# Patient Record
Sex: FEMALE | Race: WHITE | Employment: FULL TIME | URBAN - METROPOLITAN AREA
[De-identification: names, ages, dates, MRNs, and addresses within clinical notes are randomized per-mention and may not be internally consistent; named-entity substitution may affect disease eponyms.]

---

## 2017-12-19 ENCOUNTER — HOSPITAL ENCOUNTER (OUTPATIENT)
Dept: ULTRASOUND IMAGING | Age: 32
Discharge: HOME | End: 2017-12-19
Attending: FAMILY MEDICINE
Payer: OTHER GOVERNMENT

## 2017-12-19 DIAGNOSIS — N94.10 DYSPAREUNIA IN FEMALE: ICD-10-CM

## 2017-12-19 PROCEDURE — 76830 TRANSVAGINAL US NON-OB: CPT

## 2018-02-15 PROBLEM — Z30.09 ENCOUNTER FOR EVALUATION REGARDING CONTRACEPTION OPTIONS: Status: ACTIVE | Noted: 2018-02-15

## 2018-03-15 ENCOUNTER — HOSPITAL ENCOUNTER (OUTPATIENT)
Dept: PHYSICAL THERAPY | Age: 33
Discharge: HOME | End: 2018-03-15
Attending: FAMILY MEDICINE
Payer: OTHER GOVERNMENT

## 2018-03-15 DIAGNOSIS — N94.10 DYSPAREUNIA, FEMALE: ICD-10-CM

## 2018-03-15 PROCEDURE — 97530 THERAPEUTIC ACTIVITIES: CPT

## 2018-03-15 PROCEDURE — 97161 PT EVAL LOW COMPLEX 20 MIN: CPT

## 2018-03-15 NOTE — THERAPY EVALUATION
Rosalba Deandre  : 1985  Primary: Quincy Zhou Prime  Secondary:  [address]  at [facility]  [address], [address], [place] [address]  [phone]   [phone]        OUTPATIENT PHYSICAL THERAPY:Initial Assessment 3/15/2018    ICD-10: Treatment Diagnosis: R27.8 Lack of coordination (muscle incoordination)  Precautions/Allergies:   Review of patient's allergies indicates no known allergies. Fall Risk Score: 0 (? 5 = High Risk)  MD Orders: eval and treat MEDICAL/REFERRING DIAGNOSIS:  Dyspareunia, female [N94.10]   DATE OF ONSET: ; after first child was born  REFERRING PHYSICIAN: Pruitt, Benjamine Hashimoto,   [address]: Not scheduled     INITIAL ASSESSMENT:  Ms. Lukasz Small presents elevated pelvic floor muscle (PFM) activity and difficulty with coordination of PFM, likely leading with decreased sensation throughout during intercourse. Additionally she demonstrates moderate muscle and fascial restrictions of the abdomen with increased tension throughout. I believe she will benefit from skilled PT with an emphasis on pelvic floor down training, sensory input techniques, manual therapy, core stabilization and slow integration of hip stabilization over 8 weeks to improve PFM coordination and sensation and restore normal function of the pelvic floor. PROBLEM LIST (Impacting functional limitations):  1. Decreased coordination INTERVENTIONS PLANNED:  1. Electrical Stimulation  2. Home Exercise Program (HEP)  3. Manual Therapy  4. Neuromuscular Re-education/Strengthening  5. Range of Motion (ROM)  6. Therapeutic Activites  7. Therapeutic Exercise/Strengthening   TREATMENT PLAN:  Effective Dates: 3/15/2018 TO 2018 (60 days). Frequency/Duration: 1 time a week for 60 Days  GOALS: (Goals have been discussed and agreed upon with patient.)  Short-Term Functional Goals: Time Frame: 8 weeks  1. Pt will be I with HEP.   2. Pt will demonstrate normal voluntary relaxation of the pelvic floor muscle group for 30 sections via sEMG (< 2.0mV) to improve pelvic floor ROM. 3. Pt will increase hip strength to 4/5 for improved lumbopelvic stability and restore normal PFM function. Rehabilitation Potential For Stated Goals: Good  Regarding Kim Pimenteltano therapy, I certify that the treatment plan above will be carried out by a therapist or under their direction. Thank you for this referral,  Brennan Delaney, PT               The information in this section was collected on 3/15/2018 (except where otherwise noted). HISTORY:   History of Present Injury/Illness (Reason for Referral):  Alexys Grover is a 35 yo F that presents to PT w/ c/o decreased sensation during intercourse. Pt states that after her first child was born in  (via ) he began experiencing decreased sensation during intercourse. She states that intercourse in not painful, however she does not feel much deep during intercourse. She notes that she occasionally will experience sharp pain in the abdomen, however notes this only happens once every few months and goes away quickly. Urinary: urinary freq: 10x/day, occasionally up 1x/night (infrequent); awareness with urinary urge, able to put off urge \"for a long time\", occasionally experience urine loss if holding for extended periods of time, denies ELISABET/UUI, dysuria, hesitancy; Fluid intake: water x48oz, coffee x16oz, occasional soda 1x/week  Bowel: bowel freq: 1x/day, Titus stool type 3, denies push/strain, no history or current management of constipation, denies pain and FI; awareness with bowel urge and able to put off urge as needed  Sexual: sexually active, no history of sexual abuse, pt denies pain with intercourse, however notes \"decreased sensation\" during intercourse since birth of first child in ; not using lubrication    Past Medical History/Comorbidities:   Ms. Baltazar Maki  has no past medical history on file.   Ms. Baltazar Maki  has a past surgical history that includes hx lap cholecystectomy; hx wisdom teeth extraction; hx  section; and hx other surgical.  (, , ), tummy tuck/liposuction (2017)  Social History/Living Environment:     Lives at home with her  and 3 children, ages 10,10 & 3)  Prior Level of Function/Work/Activity:   Pt works full-time and sits at desk a lot during the day. Current Medications:     No current outpatient prescriptions on file. OTC supplements: fish oil, flax seed oil, \"water retention pills\", collagen/hair/nail vitamin   Date Last Reviewed:  3/15/2018    0: LOW COMPLEXITY   EXAMINATION:   Palpation:          Mild tenderness with palpation of R superficial transverse perineal and R pubococcygeus; non tender elsewhere, both superficial and deep PF. Restrictions throughout abdominal wall with good scar mobility  ROM:          Excellent PFM ROM  Strength:          P: Power, E: Endurance, R: Repetitions, QF: Quick Flicks, TrA: Transverse Abdominus, DB: Diaphragmatic Breathing  P 3/5   E 10 seconds   R 4   QF No tested   TrA    DB Chest dominant     Coordination:          Pt demonstrates good voluntary contraction, relaxation and bulge, however after endurance repetitions, pt has increased difficulty achieving full resting position of PFM  Sensation:  Sensory testing WNL, bilaterally   Body Structures Involved:  1. Nerves  2. Muscles Body Functions Affected:  1. Sensory/Pain  2. Neuromusculoskeletal Activities and Participation Affected:  1. Learning and Applying Knowledge  2. Domestic Life  3.  Interpersonal Interactions and Relationships   Number of elements (examined above) that affect the Plan of Care: 1-2: LOW COMPLEXITY   CLINICAL PRESENTATION:   Presentation: Stable and uncomplicated: LOW COMPLEXITY   CLINICAL DECISION MAKING:   Outcome Measure:   Pelvic Floor Distress Inventory - Short form (PFDI-20)  Score (out of 300) Initial: 3/15/2018   18/300 Most Recent: Interpretation of Score: This survey asks questions concerning certain bowel, bladder, or pelvic symptoms and how much these symptoms interfere with daily activities. Each section is scored on a 0-4 scale, 4 representing the greatest disability. The scores of each section (out of 100) are added together for a total score out of 300. Score 0 1-59  120-179 180-239 240-299 300   Modifier CH CI CJ CK CL CM CN       Medical Necessity:   · Patient demonstrates good rehab potential due to higher previous functional level. Reason for Services/Other Comments:  · Patient requires skilled physical therapy in order to address muscle incoordiantion and decrease sensation in order to return pt to PLOF. Use of outcome tool(s) and clinical judgement create a POC that gives a: Clear prediction of patient's progress: LOW COMPLEXITY            TREATMENT:   (In addition to Assessment/Re-Assessment sessions the following treatments were rendered)  Pre-treatment Symptoms/Complaints:  See subjective history. Pain: Initial:   Pain Intensity 1: 0  Post Session:  0     THERAPEUTIC ACTIVITY: ( 15 minutes): Therapeutic activities per grid below to improve patient's understanding of the role of PFM in sexual function and contribting factors to deficits. Required moderate verbal and tactile cues to promote coordination of breathing. Date:  3/15/2018 Date:   Date:     Activity/Exercise Parameters Parameters Parameters   Role of PFM in relation to bowel, bladder and sexual function 6 minutes     Diaphragmatic breathing 5 minutes     HEP D. Breathing & PFM drops- 4 minutes                                 MANUAL THERAPY: (5 minutes): Soft tissue mobilization was utilized and necessary because of the patient's restricted motion of soft tissue. Internal vaginal interventions performed with verbal consent.     Date Type Location Time Comments   3./15/2018 Internal assessment/treatment Via vaginal canal 5 Gentle sustained pressure, S/CS superficial and deep PF                                               (Used abbreviations: MET - muscle energy technique; S/CS- Strain counter strain; CTM-Connective tissue mobilizations; C/R- Contract/relax; SP- Sustained pressure, TrP-Trigger point release, IASTM- Instrument assisted soft tissue mobilizations, TDN-Trigger point dry needling)    [facility] Portal  Treatment/Session Assessment:    · Response to Treatment:  Pt demonstrate improved coordination of diaphragmatic breathing post education. Demonstrates good understanding of HEP. · Compliance with Program/Exercises: Will assess as treatment progresses. · Recommendations/Intent for next treatment session: \"Next visit will focus on downtraining, labial CTM, nerve glides, discuss use of e-stim for sensory input\". Total Treatment Duration: 51 minutes  PT Patient Time In/Time Out  Time In: 0810  Time Out: 92 Alec Roa  Nix, PT, DPT

## 2018-03-15 NOTE — PROGRESS NOTES
Ambulatory/Rehab Services H2 Model Falls Risk Assessment    Risk Factor Pts. ·   Confusion/Disorientation/Impulsivity  []    4 ·   Symptomatic Depression  []   2 ·   Altered Elimination  []   1 ·   Dizziness/Vertigo  []   1 ·   Gender (Male)  []   1 ·   Any administered antiepileptics (anticonvulsants):  []   2 ·   Any administered benzodiazepines:  []   1 ·   Visual Impairment (specify):  []   1 ·   Portable Oxygen Use  []   1 ·   Orthostatic ? BP  []   1 ·   History of Recent Falls (within 3 mos.)  []   5     Ability to Rise from Chair (choose one) Pts. ·   Ability to rise in a single movement  []   0 ·   Pushes up, successful in one attempt  []   1 ·   Multiple attempts, but successful  []   3 ·   Unable to rise without assistance  []   4   Total: (5 or greater = High Risk) 0     Falls Prevention Plan:   []                Physical Limitations to Exercise (specify):   []                Mobility Assistance Device (type):   []                Exercise/Equipment Adaptation (specify):    ©2010 [facility] [address] Patent #[id number].  Federal Law prohibits the replication, distribution or use without written permission from [facility] [employer]

## 2018-03-20 ENCOUNTER — HOSPITAL ENCOUNTER (OUTPATIENT)
Dept: PHYSICAL THERAPY | Age: 33
Discharge: HOME | End: 2018-03-20
Attending: FAMILY MEDICINE
Payer: OTHER GOVERNMENT

## 2018-03-20 PROCEDURE — 97530 THERAPEUTIC ACTIVITIES: CPT

## 2018-03-20 PROCEDURE — 97014 ELECTRIC STIMULATION THERAPY: CPT

## 2018-03-20 PROCEDURE — 97140 MANUAL THERAPY 1/> REGIONS: CPT

## 2018-03-20 NOTE — PROGRESS NOTES
Robin Gallardo  : 1985  Primary: Sc  Prime  Secondary:  [address]  [facility]  [address], [address], [place] [address]  [phone]   [phone]        OUTPATIENT PHYSICAL THERAPY:Daily Note 3/20/2018    ICD-10: Treatment Diagnosis: R27.8 Lack of coordination (muscle incoordination)  Precautions/Allergies:   Review of patient's allergies indicates no known allergies. Fall Risk Score: 0 (? 5 = High Risk)  MD Orders: eval and treat MEDICAL/REFERRING DIAGNOSIS:  There are no admission diagnoses documented for this encounter. DATE OF ONSET: ; after first child was born  REFERRING PHYSICIAN: Mary Ellen Lin DO  RETURN PHYSICIAN APPOINTMENT: Not scheduled     INITIAL ASSESSMENT:  Ms. Andres Smith presents elevated pelvic floor muscle (PFM) activity and difficulty with coordination of PFM, likely leading with decreased sensation throughout during intercourse. Additionally she demonstrates moderate muscle and fascial restrictions of the abdomen with increased tension throughout. I believe she will benefit from skilled PT with an emphasis on pelvic floor down training, sensory input techniques, manual therapy, core stabilization and slow integration of hip stabilization over 8 weeks to improve PFM coordination and sensation and restore normal function of the pelvic floor. PROBLEM LIST (Impacting functional limitations):  1. Decreased coordination INTERVENTIONS PLANNED:  1. Electrical Stimulation  2. Home Exercise Program (HEP)  3. Manual Therapy  4. Neuromuscular Re-education/Strengthening  5. Range of Motion (ROM)  6. Therapeutic Activites  7. Therapeutic Exercise/Strengthening   TREATMENT PLAN:  Effective Dates: 3/15/2018 TO 2018 (60 days). Frequency/Duration: 1 time a week for 60 Days  GOALS: (Goals have been discussed and agreed upon with patient.)  Short-Term Functional Goals: Time Frame: 8 weeks  1. Pt will be I with HEP.   2. Pt will demonstrate normal voluntary relaxation of the pelvic floor muscle group for 30 sections via sEMG (< 2.0mV) to improve pelvic floor ROM. 3. Pt will increase hip strength to 4/5 for improved lumbopelvic stability and restore normal PFM function. Rehabilitation Potential For Stated Goals: Good  Regarding Roc[place] therapy, I certify that the treatment plan above will be carried out by a therapist or under their direction. Thank you for this referral,  Lois Kothari PT               The information in this section was collected on 3/15/2018 (except where otherwise noted). HISTORY:   History of Present Injury/Illness (Reason for Referral):  Sharee Anthony is a 35 yo F that presents to PT w/ c/o decreased sensation during intercourse. Pt states that after her first child was born in  (via ) she began experiencing decreased sensation during intercourse. She states that intercourse is not painful, however she does not feel much deep during intercourse. She notes that she occasionally will experience sharp pain in the abdomen, however notes this only happens once every few months and goes away quickly. Urinary: urinary freq: 10x/day, occasionally up 1x/night (infrequent); awareness with urinary urge, able to put off urge \"for a long time\", occasionally experience urine loss if holding for extended periods of time, denies ELISABET/UUI, dysuria, hesitancy; Fluid intake: water x48oz, coffee x16oz, occasional soda 1x/week  Bowel: bowel freq: 1x/day, Rena Lara stool type 3, denies push/strain, no history or current management of constipation, denies pain and FI; awareness with bowel urge and able to put off urge as needed  Sexual: sexually active, no history of sexual abuse, pt denies pain with intercourse, however notes \"decreased sensation\" during intercourse since birth of first child in ; not using lubrication    Past Medical History/Comorbidities:   Ms. Erika Fitzgerald  has no past medical history on file.   Ms. Harvey Sorto  has a past surgical history that includes hx lap cholecystectomy; hx wisdom teeth extraction; hx  section; and hx other surgical.  (, , ), tummy tuck/liposuction (2017)  Social History/Living Environment:     Lives at home with her  and 3 children, ages 10,10 & 3)  Prior Level of Function/Work/Activity:   Pt works full-time and sits at desk a lot during the day. Current Medications:     No current outpatient prescriptions on file. OTC supplements: fish oil, flax seed oil, \"water retention pills\", collagen/hair/nail vitamin   Date Last Reviewed:  3/20/2018   EXAMINATION:   Palpation:          Mild tenderness with palpation of R superficial transverse perineal and R pubococcygeus; non tender elsewhere, both superficial and deep PF. Restrictions throughout abdominal wall with good scar mobility  ROM:          Excellent PFM ROM  Strength:          P: Power, E: Endurance, R: Repetitions, QF: Quick Flicks, TrA: Transverse Abdominus, DB: Diaphragmatic Breathing  P 3/5   E 10 seconds   R 4   QF No tested   TrA    DB Chest dominant     Coordination:          Pt demonstrates good voluntary contraction, relaxation and bulge, however after endurance repetitions, pt has increased difficulty achieving full resting position of PFM  Sensation:  Sensory testing WNL, bilaterally   CLINICAL PRESENTATION:   CLINICAL DECISION MAKING:   Outcome Measure:   Pelvic Floor Distress Inventory - Short form (PFDI-20)  Score (out of 300) Initial: 3/20/2018   18/300 Most Recent:      Interpretation of Score: This survey asks questions concerning certain bowel, bladder, or pelvic symptoms and how much these symptoms interfere with daily activities. Each section is scored on a 0-4 scale, 4 representing the greatest disability. The scores of each section (out of 100) are added together for a total score out of 300.   Score 0 1-59  120-179 180-239 240-299 300   Modifier CH CI CJ CK CL CM CN       Medical Necessity:   · Patient demonstrates good rehab potential due to higher previous functional level. Reason for Services/Other Comments:  · Patient requires skilled physical therapy in order to address muscle incoordiantion and decrease sensation in order to return pt to PLOF. TREATMENT:   (In addition to Assessment/Re-Assessment sessions the following treatments were rendered)  Pre-treatment Symptoms/Complaints:  Pt reports noticing increased mm tension and holding in PFM with check ins over the last week. Feels she is doing well with HEP and is able to use drop for PFM relaxation. Pain: Initial:      Post Session:  0/10; mild soreness in saddle area, however denies pain     THERAPEUTIC ACTIVITY: ( 12 minutes): Therapeutic activities per grid below to improve patient's understanding of the role of PFM in sexual function and contribting factors to deficits. Date:  3/15/2018 Date:  3/20/2018 Date:     Activity/Exercise Parameters Parameters Parameters   Role of PFM in relation to bowel, bladder and sexual function 6 minutes     Diaphragmatic breathing 5 minutes     HEP D. Breathing & PFM drops- 4 minutes Varying position for drops, decrease leg crossing at desk    Sexual Health   Increasing libido and sexual pleasure, reading rec- 4 minutes    PF drops  4 minutes                    MANUAL THERAPY: (30 minutes): Soft tissue mobilization was utilized and necessary because of the patient's restricted motion of soft tissue. Internal vaginal interventions performed with verbal consent.     Date Type Location Comments   3/20/2018 Internal assessment/treatment Via vaginal canal SP, S/CS, strumming superficial and deep PF    STM adductors Gentle skin rolling    STM Nerve glides Pudendal- inferior rectal and dorsal n to clitoris                             (Used abbreviations: MET - muscle energy technique; S/CS- Strain counter strain; CTM-Connective tissue mobilizations; C/R- Contract/relax; SP- Sustained pressure, TrP-Trigger point release, IASTM- Instrument assisted soft tissue mobilizations, TDN-Trigger point dry needling)    Electrical Stimulation ( 15 minutes): e-stim via vaginal canal for sensory input to PFM for increased sensation    [facility] Portal  Treatment/Session Assessment:    · Response to Treatment: Pt demonstrates slightly increased PFM tension throughout superificial and deep PFM today. She demonstrate good understanding of drops for PFM relaxation, however is limited in ROM. MT via vaginal canal allowed for improved relaxation of PFM with CR technique. Initiated use of e-stim for sensory input to PF today. We discussed reading material for regaining sexual desire. · Compliance with Program/Exercises: Pt reports compliance with HEP. · Recommendations/Intent for next treatment session: \"Next visit will focus on downtraining, labial CTM, nerve glides, discuss use of e-stim for sensory input, flexibility- happy baby, child's pose\". Total Treatment Duration: 57 minutes       Cora Wren, PT, DPT

## 2018-03-26 ENCOUNTER — HOSPITAL ENCOUNTER (OUTPATIENT)
Dept: PHYSICAL THERAPY | Age: 33
Discharge: HOME | End: 2018-03-26
Attending: FAMILY MEDICINE
Payer: OTHER GOVERNMENT

## 2018-03-26 PROCEDURE — 97140 MANUAL THERAPY 1/> REGIONS: CPT

## 2018-03-26 PROCEDURE — 97530 THERAPEUTIC ACTIVITIES: CPT

## 2018-03-26 PROCEDURE — 97014 ELECTRIC STIMULATION THERAPY: CPT

## 2018-03-26 NOTE — PROGRESS NOTES
Nola Rowell  : 1985  Primary: [place]  [place]  Secondary:  [address]  at [facility]  [address], [address], [place]  [phone]   [phone]        OUTPATIENT PHYSICAL THERAPY:Daily Note 3/26/2018    ICD-10: Treatment Diagnosis: R27.8 Lack of coordination (muscle incoordination)  Precautions/Allergies:   Review of patient's allergies indicates no known allergies. Fall Risk Score: 0 (? 5 = High Risk)  MD Orders: eval and treat MEDICAL/REFERRING DIAGNOSIS:   Dyspareunia, female   DATE OF ONSET: ; after first child was born  [de-identified] PHYSICIAN: Elliott Lin DO  [address]: Not scheduled     INITIAL ASSESSMENT:  Ms. Miranda Love presents elevated pelvic floor muscle (PFM) activity and difficulty with coordination of PFM, likely leading with decreased sensation throughout during intercourse. Additionally she demonstrates moderate muscle and fascial restrictions of the abdomen with increased tension throughout. I believe she will benefit from skilled PT with an emphasis on pelvic floor down training, sensory input techniques, manual therapy, core stabilization and slow integration of hip stabilization over 8 weeks to improve PFM coordination and sensation and restore normal function of the pelvic floor. PROBLEM LIST (Impacting functional limitations):  1. Decreased coordination INTERVENTIONS PLANNED:  1. Electrical Stimulation  2. Home Exercise Program (HEP)  3. Manual Therapy  4. Neuromuscular Re-education/Strengthening  5. Range of Motion (ROM)  6. Therapeutic Activites  7. Therapeutic Exercise/Strengthening   TREATMENT PLAN:  Effective Dates: 3/15/2018 TO 2018 (60 days). Frequency/Duration: 1 time a week for 60 Days  GOALS: (Goals have been discussed and agreed upon with patient.)  Short-Term Functional Goals: Time Frame: 8 weeks  1. Pt will be I with HEP.   2. Pt will demonstrate normal voluntary relaxation of the pelvic floor muscle group for 30 sections via sEMG (< 2.0mV) to improve pelvic floor ROM. 3. Pt will increase hip strength to 4/5 for improved lumbopelvic stability and restore normal PFM function. Rehabilitation Potential For Stated Goals: Good  Regarding Savanna Morales therapy, I certify that the treatment plan above will be carried out by a therapist or under their direction. Thank you for this referral,  Susan Lopez, PT, DPT               The information in this section was collected on 3/15/2018 (except where otherwise noted). HISTORY:   History of Present Injury/Illness (Reason for Referral):  Ashlyn Partida is a 33 yo F that presents to PT w/ c/o decreased sensation during intercourse. Pt states that after her first child was born in  (via ) she began experiencing decreased sensation during intercourse. She states that intercourse is not painful, however she does not feel much deep during intercourse. She notes that she occasionally will experience sharp pain in the abdomen, however notes this only happens once every few months and goes away quickly. Urinary: urinary freq: 10x/day, occasionally up 1x/night (infrequent); awareness with urinary urge, able to put off urge \"for a long time\", occasionally experience urine loss if holding for extended periods of time, denies ELISABET/UUI, dysuria, hesitancy; Fluid intake: water x48oz, coffee x16oz, occasional soda 1x/week  Bowel: bowel freq: 1x/day, [place] stool type 3, denies push/strain, no history or current management of constipation, denies pain and FI; awareness with bowel urge and able to put off urge as needed  Sexual: sexually active, no history of sexual abuse, pt denies pain with intercourse, however notes \"decreased sensation\" during intercourse since birth of first child in ; not using lubrication    Past Medical History/Comorbidities:   Ms. Ellen Esquivel  has no past medical history on file.   Ms. Ellen Esquivel  has a past surgical history that includes hx lap cholecystectomy; hx wisdom teeth extraction; hx  section; and hx other surgical.  (, , ), tummy tuck/liposuction (2017)  Social History/Living Environment:     Lives at home with her  and 3 children, ages 10,10 & 3)  Prior Level of Function/Work/Activity:   Pt works full-time and sits at desk a lot during the day. Current Medications:     No current outpatient prescriptions on file. OTC supplements: fish oil, flax seed oil, \"water retention pills\", collagen/hair/nail vitamin   Date Last Reviewed:  3/26/2018   EXAMINATION:   Palpation:          Mild tenderness with palpation of R superficial transverse perineal and R pubococcygeus; non tender elsewhere, both superficial and deep PF. Restrictions throughout abdominal wall with good scar mobility  ROM:          Excellent PFM ROM  Strength:          P: Power, E: Endurance, R: Repetitions, QF: Quick Flicks, TrA: Transverse Abdominus, DB: Diaphragmatic Breathing  P 3/5   E 10 seconds   R 4   QF No tested   TrA    DB Chest dominant     Coordination:          Pt demonstrates good voluntary contraction, relaxation and bulge, however after endurance repetitions, pt has increased difficulty achieving full resting position of PFM  Sensation:  Sensory testing WNL, bilaterally   CLINICAL PRESENTATION:   CLINICAL DECISION MAKING:   Outcome Measure:   Pelvic Floor Distress Inventory - Short form (PFDI-20)  Score (out of 300) Initial: 3/26/2018   18/300 Most Recent:      Interpretation of Score: This survey asks questions concerning certain bowel, bladder, or pelvic symptoms and how much these symptoms interfere with daily activities. Each section is scored on a 0-4 scale, 4 representing the greatest disability. The scores of each section (out of 100) are added together for a total score out of 300.   Score 0 1-59  120-179 180-239 240-299 300   Modifier CH CI CJ CK CL CM CN       Medical Necessity:   · Patient demonstrates good rehab potential due to higher previous functional level. Reason for Services/Other Comments:  · Patient requires skilled physical therapy in order to address muscle incoordiantion and decrease sensation in order to return pt to PLOF. TREATMENT:   (In addition to Assessment/Re-Assessment sessions the following treatments were rendered)  Pre-treatment Symptoms/Complaints:  Pt states that she had some soreness in saddle region and mild spotting the day following PT. Pain: Initial:   Pain Intensity 1: 0  Post Session: 0     THERAPEUTIC ACTIVITY: (10 minutes): Therapeutic activities per grid below to improve patient's understanding of the role of PFM in sexual function and contribting factors to deficits. Date:  3/15/2018 Date:  3/20/2018 Date:  3/26/2018   Activity/Exercise Parameters Parameters Parameters   Role of PFM in relation to bowel, bladder and sexual function 6 minutes     Diaphragmatic breathing (DB) 5 minutes     HEP D. Breathing & PFM drops- 4 minutes Varying position for drops, decrease leg crossing at desk Drops, DB, happy baby and child's pose   Sexual Health   Increasing libido and sexual pleasure, reading rec- 4 minutes    PF drops  4 minutes 4 minutes                 THERAPEUTIC EXERCISE (8 minutes): Therapeutic exercises per grid below to improve patient flexibility and promote PFM relaxation and downtraining. Date:  3/26/2018 Date:   Date:     Activity/Exercise Parameters Parameters Parameters   Happy baby 3 x 30s     Child's pose 3 x 30s                                         MANUAL THERAPY: (25 minutes): Soft tissue mobilization was utilized and necessary because of the patient's restricted motion of soft tissue. Internal vaginal interventions performed with verbal consent.     Date Type Location Comments   3/26/2018 Internal assessment/treatment Via vaginal canal SP, S/CS, strumming superficial and deep PF    STM adductors Gentle skin rolling    STM Nerve glides Pudendal- inferior rectal and dorsal n to clitoris                             (Used abbreviations: MET - muscle energy technique; S/CS- Strain counter strain; CTM-Connective tissue mobilizations; C/R- Contract/relax; SP- Sustained pressure, TrP-Trigger point release, IASTM- Instrument assisted soft tissue mobilizations, TDN-Trigger point dry needling)    Electrical Stimulation (15 minutes): e-stim via vaginal canal for sensory input to PFM for increased sensation and awareness    [employer]  Treatment/Session Assessment:    · Response to Treatment: Pt with mildly delayed relaxation during PFM drop with palpation via vaginal canal. Reports mild tenderness at iliococcygeus R>L (R causing wincing), improves with S/CS. Addition of happy baby and child's pose stretches in order to decreased PFM tension and promote relaxation. Pt demonstrates good understanding and compliance with HEP. · Compliance with Program/Exercises: Pt reports compliance with HEP. · Recommendations/Intent for next treatment session: \"Next visit will focus on biofeedback- downtraining, labial CTM, nerve glides, e-stim for sensory input, flexibility- happy baby, child's pose\". Total Treatment Duration: 58 minutes  PT Patient Time In/Time Out  Time In: 0733  Time Out: [address].  Nix, PT, DPT

## 2018-04-02 ENCOUNTER — HOSPITAL ENCOUNTER (OUTPATIENT)
Dept: PHYSICAL THERAPY | Age: 33
Discharge: HOME | End: 2018-04-02
Attending: FAMILY MEDICINE
Payer: OTHER GOVERNMENT

## 2018-04-02 PROCEDURE — 97140 MANUAL THERAPY 1/> REGIONS: CPT

## 2018-04-02 PROCEDURE — 97014 ELECTRIC STIMULATION THERAPY: CPT

## 2018-04-02 PROCEDURE — 97530 THERAPEUTIC ACTIVITIES: CPT

## 2018-04-02 NOTE — PROGRESS NOTES
Paola Castillo  : 1985  Primary: [place]  Secondary:  [address]  at [facility]  [address], [address], [place] [address]  [phone]   [phone]        OUTPATIENT PHYSICAL THERAPY:Daily Note 2018    ICD-10: Treatment Diagnosis: R27.8 Lack of coordination (muscle incoordination)  Precautions/Allergies:   Review of patient's allergies indicates no known allergies. Fall Risk Score: 0 (? 5 = High Risk)  MD Orders: eval and treat MEDICAL/REFERRING DIAGNOSIS:   Dyspareunia, female   DATE OF ONSET: ; after first child was born  [de-identified] PHYSICIAN: Danny Lin,   [address]: Not scheduled     INITIAL ASSESSMENT:  Ms. Simon Akhtar presents elevated pelvic floor muscle (PFM) activity and difficulty with coordination of PFM, likely leading with decreased sensation throughout during intercourse. Additionally she demonstrates moderate muscle and fascial restrictions of the abdomen with increased tension throughout. I believe she will benefit from skilled PT with an emphasis on pelvic floor down training, sensory input techniques, manual therapy, core stabilization and slow integration of hip stabilization over 8 weeks to improve PFM coordination and sensation and restore normal function of the pelvic floor. PROBLEM LIST (Impacting functional limitations):  1. Decreased coordination INTERVENTIONS PLANNED:  1. Electrical Stimulation  2. Home Exercise Program (HEP)  3. Manual Therapy  4. Neuromuscular Re-education/Strengthening  5. Range of Motion (ROM)  6. Therapeutic Activites  7. Therapeutic Exercise/Strengthening   TREATMENT PLAN:  Effective Dates: 3/15/2018 TO 2018 (60 days). Frequency/Duration: 1 time a week for 60 Days  GOALS: (Goals have been discussed and agreed upon with patient.)  Short-Term Functional Goals: Time Frame: 8 weeks  1. Pt will be I with HEP.   2. Pt will demonstrate normal voluntary relaxation of the pelvic floor muscle group for 30 sections via sEMG (< 2.0mV) to improve pelvic floor ROM. 3. Pt will increase hip strength to 4/5 for improved lumbopelvic stability and restore normal PFM function. Rehabilitation Potential For Stated Goals: Good  Regarding Kaden Dixon therapy, I certify that the treatment plan above will be carried out by a therapist or under their direction. Thank you for this referral,  Rain Kerr, PT, DPT               The information in this section was collected on 3/15/2018 (except where otherwise noted). HISTORY:   History of Present Injury/Illness (Reason for Referral):  Manjit Carreon is a 33 yo F that presents to PT w/ c/o decreased sensation during intercourse. Pt states that after her first child was born in  (via ) she began experiencing decreased sensation during intercourse. She states that intercourse is not painful, however she does not feel much deep during intercourse. She notes that she occasionally will experience sharp pain in the abdomen, however notes this only happens once every few months and goes away quickly. Urinary: urinary freq: 10x/day, occasionally up 1x/night (infrequent); awareness with urinary urge, able to put off urge \"for a long time\", occasionally experience urine loss if holding for extended periods of time, denies ELISABET/UUI, dysuria, hesitancy; Fluid intake: water x48oz, coffee x16oz, occasional soda 1x/week  Bowel: bowel freq: 1x/day, [place] stool type 3, denies push/strain, no history or current management of constipation, denies pain and FI; awareness with bowel urge and able to put off urge as needed  Sexual: sexually active, no history of sexual abuse, pt denies pain with intercourse, however notes \"decreased sensation\" during intercourse since birth of first child in ; not using lubrication    Past Medical History/Comorbidities:   Ms. Kristy Mehta  has no past medical history on file.   Ms. Kristy Mehta  has a past surgical history that includes hx lap cholecystectomy; hx wisdom teeth extraction; hx  section; and hx other surgical.  (, , ), tummy tuck/liposuction (2017)  Social History/Living Environment:     Lives at home with her  and 3 children, ages 10,10 & 3)  Prior Level of Function/Work/Activity:   Pt works full-time and sits at desk a lot during the day. Current Medications:     No current outpatient prescriptions on file. OTC supplements: fish oil, flax seed oil, \"water retention pills\", collagen/hair/nail vitamin   Date Last Reviewed:  2018   EXAMINATION:   Palpation:          Mild tenderness with palpation of R superficial transverse perineal and R pubococcygeus; non tender elsewhere, both superficial and deep PF. Restrictions throughout abdominal wall with good scar mobility  ROM:          Excellent PFM ROM  Strength:          P: Power, E: Endurance, R: Repetitions, QF: Quick Flicks, TrA: Transverse Abdominus, DB: Diaphragmatic Breathing  P 3/5   E 10 seconds   R 4   QF No tested   TrA    DB Chest dominant     Coordination:          Pt demonstrates good voluntary contraction, relaxation and bulge, however after endurance repetitions, pt has increased difficulty achieving full resting position of PFM  Sensation:  Sensory testing WNL, bilaterally   CLINICAL PRESENTATION:   CLINICAL DECISION MAKING:   Outcome Measure:   Pelvic Floor Distress Inventory - Short form (PFDI-20)  Score (out of 300) Initial: 2018 Most Recent:      Interpretation of Score: This survey asks questions concerning certain bowel, bladder, or pelvic symptoms and how much these symptoms interfere with daily activities. Each section is scored on a 0-4 scale, 4 representing the greatest disability. The scores of each section (out of 100) are added together for a total score out of 300.   Score 0 1-59  120-179 180-239 240-299 300   Modifier CH CI CJ CK CL CM CN       Medical Necessity:   · Patient demonstrates good rehab potential due to higher previous functional level. Reason for Services/Other Comments:  · Patient requires skilled physical therapy in order to address muscle incoordiantion and decrease sensation in order to return pt to PLOF. TREATMENT:   (In addition to Assessment/Re-Assessment sessions the following treatments were rendered)  Pre-treatment Symptoms/Complaints:  Pt states that she feels the exercises and therapy is helping. Has had intercourse and is noticing changes overall. Pain: Initial:      Post Session: 0     THERAPEUTIC ACTIVITY: ( 15 minutes): Therapeutic activities per grid below to improve patient's understanding of the role of PFM in sexual function and contribting factors to deficits. Date:  3/15/2018 Date:  3/20/2018 Date:  3/26/2018 Date:  4/2/2018     Activity/Exercise Parameters Parameters Parameters      Role of PFM in relation to bowel, bladder and sexual function 6 minutes        Diaphragmatic breathing (DB) 5 minutes        HEP D. Breathing & PFM drops- 4 minutes Varying position for drops, decrease leg crossing at desk Drops, DB, happy baby and child's pose      Sexual Health   Increasing libido and sexual pleasure, reading rec- 4 minutes  Lubrication, sexual positioning     PF drops  4 minutes 4 minutes W/ biofeedback (internal sensor) 10 minutes                         THERAPEUTIC EXERCISE ( 0 minutes): Therapeutic exercises per grid below to improve patient flexibility and promote PFM relaxation and downtraining. Date:  3/26/2018 Date:  4/2/2018 Date:     Activity/Exercise Parameters Parameters Parameters   Happy baby 3 x 30s     Child's pose 3 x 30s                                       MANUAL THERAPY: ( 25 minutes): Soft tissue mobilization was utilized and necessary because of the patient's restricted motion of soft tissue. Internal vaginal interventions performed with verbal consent.     Date Type Location Comments   4/2/2018   Internal assessment/treatment Via vaginal canal SP, S/CS, strumming superficial and deep PF    STM adductors Gentle skin rolling    STM Nerve glides Pudendal- inferior rectal and dorsal n to clitoris    STM Suprapubic/lower abdominals Skin rolling                       (Used abbreviations: MET - muscle energy technique; S/CS- Strain counter strain; CTM-Connective tissue mobilizations; C/R- Contract/relax; SP- Sustained pressure, TrP-Trigger point release, IASTM- Instrument assisted soft tissue mobilizations, TDN-Trigger point dry needling)    Electrical Stimulation (15 minutes): e-stim via vaginal canal for sensory input to PFM for increased sensation and awareness    [employer]  Treatment/Session Assessment:    · Response to Treatment: Pt demonstrates good coordination of relaxation with sEMG today. Her resting tone is consistently around 3.0mV with internal sensor. She demonstrates mm tone WNL with palpation via vaginal canal. Pt was educated on importance of lubrication use and sexual positioning to allow for manual stimulation, as well as importance of foreplay in arousal continuum for women. Pt mentions LBP at end of session, will address further at next treatment session. · Compliance with Program/Exercises: Pt reports compliance with HEP. · Recommendations/Intent for next treatment session: \"Next visit will focus on labial CTM, nerve glides, e-stim for sensory input, flexibility, abdominal bracing, and core activation for LBP\". Total Treatment Duration: 55 minutes       Cora Wren, PT, DPT

## 2018-04-09 ENCOUNTER — HOSPITAL ENCOUNTER (OUTPATIENT)
Dept: PHYSICAL THERAPY | Age: 33
Discharge: HOME | End: 2018-04-09
Attending: FAMILY MEDICINE
Payer: OTHER GOVERNMENT

## 2018-04-09 PROCEDURE — 97014 ELECTRIC STIMULATION THERAPY: CPT

## 2018-04-09 PROCEDURE — 97140 MANUAL THERAPY 1/> REGIONS: CPT

## 2018-04-09 PROCEDURE — 97110 THERAPEUTIC EXERCISES: CPT

## 2018-04-09 NOTE — PROGRESS NOTES
Link Safer  : 1985  Primary: [place]  Prime  Secondary:  [address]  at [facility]  [address], [address], [place] [address]  [phone]   [phone]        OUTPATIENT PHYSICAL THERAPY:Daily Note 2018    ICD-10: Treatment Diagnosis: R27.8 Lack of coordination (muscle incoordination)  Precautions/Allergies:   Review of patient's allergies indicates no known allergies. Fall Risk Score: 0 (? 5 = High Risk)  MD Orders: eval and treat MEDICAL/REFERRING DIAGNOSIS:   Dyspareunia, female   DATE OF ONSET: ; after first child was born  [de-identified] PHYSICIAN: Rocío Lin,   [address]: Not scheduled     INITIAL ASSESSMENT:  Ms. Caty Butts presents elevated pelvic floor muscle (PFM) activity and difficulty with coordination of PFM, likely leading with decreased sensation throughout during intercourse. Additionally she demonstrates moderate muscle and fascial restrictions of the abdomen with increased tension throughout. I believe she will benefit from skilled PT with an emphasis on pelvic floor down training, sensory input techniques, manual therapy, core stabilization and slow integration of hip stabilization over 8 weeks to improve PFM coordination and sensation and restore normal function of the pelvic floor. PROBLEM LIST (Impacting functional limitations):  1. Decreased coordination INTERVENTIONS PLANNED:  1. Electrical Stimulation  2. Home Exercise Program (HEP)  3. Manual Therapy  4. Neuromuscular Re-education/Strengthening  5. Range of Motion (ROM)  6. Therapeutic Activites  7. Therapeutic Exercise/Strengthening   TREATMENT PLAN:  Effective Dates: 3/15/2018 TO 2018 (60 days). Frequency/Duration: 1 time a week for 60 Days  GOALS: (Goals have been discussed and agreed upon with patient.)  Short-Term Functional Goals: Time Frame: 8 weeks  1. Pt will be I with HEP. (ONGOING 2018)  2.  Pt will demonstrate normal voluntary relaxation of the pelvic floor muscle group for 30 seconds via sEMG (< 2.0mV) to improve pelvic floor ROM. 3. Pt will increase hip strength to 4/5 for improved lumbopelvic stability and restore normal PFM function. Rehabilitation Potential For Stated Goals: Good                The information in this section was collected on 3/15/2018 (except where otherwise noted). HISTORY:   History of Present Injury/Illness (Reason for Referral):  Bozena Arenas is a 35 yo F that presents to PT w/ c/o decreased sensation during intercourse. Pt states that after her first child was born in  (via ) she began experiencing decreased sensation during intercourse. She states that intercourse is not painful, however she does not feel much deep during intercourse. She notes that she occasionally will experience sharp pain in the abdomen, however notes this only happens once every few months and goes away quickly. Urinary: urinary freq: 10x/day, occasionally up 1x/night (infrequent); awareness with urinary urge, able to put off urge \"for a long time\", occasionally experience urine loss if holding for extended periods of time, denies ELISABET/UUI, dysuria, hesitancy; Fluid intake: water x48oz, coffee x16oz, occasional soda 1x/week  Bowel: bowel freq: 1x/day, [place] stool type 3, denies push/strain, no history or current management of constipation, denies pain and FI; awareness with bowel urge and able to put off urge as needed  Sexual: sexually active, no history of sexual abuse, pt denies pain with intercourse, however notes \"decreased sensation\" during intercourse since birth of first child in ; not using lubrication    Past Medical History/Comorbidities:   Ms. Chavez Byrne  has no past medical history on file.   Ms. Chavez Byrne  has a past surgical history that includes hx lap cholecystectomy; hx wisdom teeth extraction; hx  section; and hx other surgical.  (, , ), tummy tuck/liposuction (August 2017)  Social History/Living Environment:     Lives at home with her  and 3 children, ages 10,10 & 3)  Prior Level of Function/Work/Activity:   Pt works full-time and sits at desk a lot during the day. Current Medications:     No current outpatient prescriptions on file. OTC supplements: fish oil, flax seed oil, \"water retention pills\", collagen/hair/nail vitamin   Date Last Reviewed:  4/9/2018   EXAMINATION:   Palpation:          Mild tenderness with palpation of R superficial transverse perineal and R pubococcygeus; non tender elsewhere, both superficial and deep PF. Restrictions throughout abdominal wall with good scar mobility  ROM:          Excellent PFM ROM, difficulty maintaining PFM relaxation  Strength:          P: Power, E: Endurance, R: Repetitions, QF: Quick Flicks, TrA: Transverse Abdominus, DB: Diaphragmatic Breathing  P 3/5   E 10 seconds   R 4   QF No tested   TrA    DB Chest dominant     Coordination:          Pt demonstrates good voluntary contraction, relaxation and bulge, however after endurance repetitions, pt has increased difficulty achieving full resting position of PFM  Sensation:  Sensory testing WNL, bilaterally   CLINICAL PRESENTATION:   CLINICAL DECISION MAKING:   Outcome Measure:   Pelvic Floor Distress Inventory - Short form (PFDI-20)  Score (out of 300) Initial: 4/9/2018   18/300 Most Recent:      Interpretation of Score: This survey asks questions concerning certain bowel, bladder, or pelvic symptoms and how much these symptoms interfere with daily activities. Each section is scored on a 0-4 scale, 4 representing the greatest disability. The scores of each section (out of 100) are added together for a total score out of 300. Score 0 1-59  120-179 180-239 240-299 300   Modifier CH CI CJ CK CL CM CN       Medical Necessity:   · Patient demonstrates good rehab potential due to higher previous functional level.   Reason for Services/Other Comments:  · Patient requires skilled physical therapy in order to address muscle incoordiantion and decrease sensation in order to return pt to PLOF. TREATMENT:   (In addition to Assessment/Re-Assessment sessions the following treatments were rendered)  Pre-treatment Symptoms/Complaints: Pt states that her back is feel better today. Reports compliance with HEP. Pain: Initial:   Pain Intensity 1: 0  Post Session: 0     THERAPEUTIC ACTIVITY: ( 3 minutes): Therapeutic activities per grid below to improve patient's understanding of the role of PFM in sexual function and contribting factors to deficits. Date:  3/15/2018 Date:  3/20/2018 Date:  3/26/2018 Date:  4/2/2018 Date:   4/9/2018    Activity/Exercise Parameters Parameters Parameters      Role of PFM in relation to bowel, bladder and sexual function 6 minutes        Diaphragmatic breathing (DB) 5 minutes        HEP D. Breathing & PFM drops- 4 minutes Varying position for drops, decrease leg crossing at desk Drops, DB, happy baby and child's pose      Sexual Health   Increasing libido and sexual pleasure, reading rec- 4 minutes  Lubrication, sexual positioning     PF drops  4 minutes 4 minutes W/ biofeedback (internal sensor) 10 minutes W/ manual palpation via vaginal canal x15                        THERAPEUTIC EXERCISE ( 10 minutes): Therapeutic exercises per grid below to improve patient flexibility and promote PFM relaxation and downtraining. Date:  3/26/2018 Date:  4/9/2018 Date:     Activity/Exercise Parameters Parameters Parameters   Happy baby 3 x 30s 3 x 30s    Child's pose 3 x 30s 3 x 30s    TA bracing  Supine 10x8s; verbal cuing for maintaining breathing    Cat/cow  3x10 (into child's pose)                          MANUAL THERAPY: ( 30 minutes): Soft tissue mobilization was utilized and necessary because of the patient's restricted motion of soft tissue. Internal vaginal interventions performed with verbal consent.     Date Type Location Comments   4/9/2018 Internal assessment/treatment Via vaginal canal SP, S/CS, strumming superficial and deep PF    STM adductors Gentle skin rolling    STM Nerve glides/labial CTM Pudendal- inferior rectal and dorsal n to clitoris    STM Suprapubic/lower abdominals Skin rolling                       (Used abbreviations: MET - muscle energy technique; S/CS- Strain counter strain; CTM-Connective tissue mobilizations; C/R- Contract/relax; SP- Sustained pressure, TrP-Trigger point release, IASTM- Instrument assisted soft tissue mobilizations, TDN-Trigger point dry needling)    Electrical Stimulation (15 minutes): e-stim via vaginal canal for sensory input to PFM for increased sensation and awareness    [employer] [facility] Portal  Treatment/Session Assessment:    · Response to Treatment: Pt continues to make good progress toward goals. She demonstrates breath holding with performance of abdominal (TA) bracing. With improved breath she had decreased power. Pt was instructed in role of TA in core support and relation to LBP. She was instructed to focus on TA bracing w/ coordination of proper breathing, followed by down training-- drops, flexibility for PFM relaxation in order to progress to additional strengthening at next session. · Compliance with Program/Exercises: Pt reports compliance with HEP. · Recommendations/Intent for next treatment session: \"Next visit will focus on labial CTM, nerve glides, e-stim for sensory input, flexibility, progress abdominal bracing w/ movement, hip strength\". Total Treatment Duration: 58 minutes  PT Patient Time In/Time Out  Time In: 5610  Time Out: [address].  Nix, PT, DPT

## 2018-04-16 ENCOUNTER — HOSPITAL ENCOUNTER (OUTPATIENT)
Dept: PHYSICAL THERAPY | Age: 33
Discharge: HOME | End: 2018-04-16
Attending: FAMILY MEDICINE
Payer: OTHER GOVERNMENT

## 2018-04-16 NOTE — PROGRESS NOTES
Mima Soriano  : 1985  Primary: [place]  [place]  Secondary:  [facility] [place] [address], [address], [place] [address]  [phone]   [phone]        OUTPATIENT DAILY NOTE    NAME/AGE/GENDER: Mima Soriano is a 34 y.o. female. DATE: 2018    Patient cancelled appointment for today due to scheduling conflict. Will plan to follow up on next scheduled visit.     Laura Payne, PT, DPT

## 2018-04-23 ENCOUNTER — HOSPITAL ENCOUNTER (OUTPATIENT)
Dept: PHYSICAL THERAPY | Age: 33
Discharge: HOME | End: 2018-04-23
Attending: FAMILY MEDICINE
Payer: OTHER GOVERNMENT

## 2018-04-23 PROCEDURE — 97140 MANUAL THERAPY 1/> REGIONS: CPT

## 2018-04-23 PROCEDURE — 97110 THERAPEUTIC EXERCISES: CPT

## 2018-04-23 NOTE — PROGRESS NOTES
Denver Brandon  : 1985  Primary: [employer]  Secondary:  [address]  at [facility]  [address], [address], [place] [address]  [phone]   [phone]        OUTPATIENT PHYSICAL THERAPY:Daily Note 2018    ICD-10: Treatment Diagnosis: R27.8 Lack of coordination (muscle incoordination)  Precautions/Allergies:   Review of patient's allergies indicates no known allergies. Fall Risk Score: 0 (? 5 = High Risk)  MD Orders: eval and treat MEDICAL/REFERRING DIAGNOSIS:   Dyspareunia, female   DATE OF ONSET: ; after first child was born  [de-identified] PHYSICIAN: Kathie Lin,   [address]: Not scheduled     INITIAL ASSESSMENT:  Ms. Chavez Byrne presents elevated pelvic floor muscle (PFM) activity and difficulty with coordination of PFM, likely leading with decreased sensation throughout during intercourse. Additionally she demonstrates moderate muscle and fascial restrictions of the abdomen with increased tension throughout. I believe she will benefit from skilled PT with an emphasis on pelvic floor down training, sensory input techniques, manual therapy, core stabilization and slow integration of hip stabilization over 8 weeks to improve PFM coordination and sensation and restore normal function of the pelvic floor. PROBLEM LIST (Impacting functional limitations):  1. Decreased coordination INTERVENTIONS PLANNED:  1. Electrical Stimulation  2. Home Exercise Program (HEP)  3. Manual Therapy  4. Neuromuscular Re-education/Strengthening  5. Range of Motion (ROM)  6. Therapeutic Activites  7. Therapeutic Exercise/Strengthening   TREATMENT PLAN:  Effective Dates: 3/15/2018 TO 2018 (60 days). Frequency/Duration: 1 time a week for 60 Days  GOALS: (Goals have been discussed and agreed upon with patient.)  Short-Term Functional Goals: Time Frame: 8 weeks  1. Pt will be I with HEP. (ONGOING 2018)  2.  Pt will demonstrate normal voluntary relaxation of the pelvic floor muscle group for 30 seconds via sEMG (< 2.0mV) to improve pelvic floor ROM. 3. Pt will increase hip strength to 4/5 for improved lumbopelvic stability and restore normal PFM function. Rehabilitation Potential For Stated Goals: Good                The information in this section was collected on 3/15/2018 (except where otherwise noted). HISTORY:   History of Present Injury/Illness (Reason for Referral):  Fe Padilla is a 35 yo F that presents to PT w/ c/o decreased sensation during intercourse. Pt states that after her first child was born in  (via ) she began experiencing decreased sensation during intercourse. She states that intercourse is not painful, however she does not feel much deep during intercourse. She notes that she occasionally will experience sharp pain in the abdomen, however notes this only happens once every few months and goes away quickly. Urinary: urinary freq: 10x/day, occasionally up 1x/night (infrequent); awareness with urinary urge, able to put off urge \"for a long time\", occasionally experience urine loss if holding for extended periods of time, denies ELISABET/UUI, dysuria, hesitancy; Fluid intake: water x48oz, coffee x16oz, occasional soda 1x/week  Bowel: bowel freq: 1x/day, [place] stool type 3, denies push/strain, no history or current management of constipation, denies pain and FI; awareness with bowel urge and able to put off urge as needed  Sexual: sexually active, no history of sexual abuse, pt denies pain with intercourse, however notes \"decreased sensation\" during intercourse since birth of first child in ; not using lubrication    Past Medical History/Comorbidities:   Ms. Ilan Crow  has no past medical history on file.   Ms. Ilan Crow  has a past surgical history that includes hx lap cholecystectomy; hx wisdom teeth extraction; hx  section; and hx other surgical.  (, , ), tummy tuck/liposuction (August 2017)  Social History/Living Environment:     Lives at home with her  and 3 children, ages 10,10 & 3)  Prior Level of Function/Work/Activity:   Pt works full-time and sits at desk a lot during the day. Current Medications:     No current outpatient prescriptions on file. OTC supplements: fish oil, flax seed oil, \"water retention pills\", collagen/hair/nail vitamin   Date Last Reviewed:  4/23/2018   EXAMINATION:   Palpation:          Mild tenderness with palpation of R superficial transverse perineal and R pubococcygeus; non tender elsewhere, both superficial and deep PF. Restrictions throughout abdominal wall with good scar mobility  ROM:          Excellent PFM ROM, difficulty maintaining PFM relaxation  Strength:          P: Power, E: Endurance, R: Repetitions, QF: Quick Flicks, TrA: Transverse Abdominus, DB: Diaphragmatic Breathing  P 3/5   E 10 seconds   R 4   QF No tested   TrA    DB Chest dominant     Coordination:          Pt demonstrates good voluntary contraction, relaxation and bulge, however after endurance repetitions, pt has increased difficulty achieving full resting position of PFM  Sensation:  Sensory testing WNL, bilaterally   CLINICAL PRESENTATION:   CLINICAL DECISION MAKING:   Outcome Measure:   Pelvic Floor Distress Inventory - Short form (PFDI-20)  Score (out of 300) Initial: 4/23/2018   18/300 Most Recent:      Interpretation of Score: This survey asks questions concerning certain bowel, bladder, or pelvic symptoms and how much these symptoms interfere with daily activities. Each section is scored on a 0-4 scale, 4 representing the greatest disability. The scores of each section (out of 100) are added together for a total score out of 300. Score 0 1-59  120-179 180-239 240-299 300   Modifier CH CI CJ CK CL CM CN       Medical Necessity:   · Patient demonstrates good rehab potential due to higher previous functional level.   Reason for Services/Other Comments:  · Patient requires skilled physical therapy in order to address muscle incoordiantion and decrease sensation in order to return pt to PLOF. TREATMENT:   (In addition to Assessment/Re-Assessment sessions the following treatments were rendered)  Pre-treatment Symptoms/Complaints: Pt arrived 10 minutes late to session. Pt reports that she is still having trouble completing abdominal bracing with breathing but is getting a little better. Pain: Initial:   Pain Intensity 1: 0  Post Session: 0     THERAPEUTIC ACTIVITY: ( 5 minutes): Therapeutic activities per grid below to improve patient's understanding of the role of PFM in sexual function and contribting factors to deficits. Date:  3/15/2018 Date:  3/20/2018 Date:  3/26/2018 Date:  4/2/2018 Date:   4/9/2018 Date:  4/23/2018   Activity/Exercise Parameters Parameters Parameters      Role of PFM in relation to bowel, bladder and sexual function 6 minutes        Diaphragmatic breathing (DB) 5 minutes        HEP D. Breathing & PFM drops- 4 minutes Varying position for drops, decrease leg crossing at desk Drops, DB, happy baby and child's pose      Sexual Health   Increasing libido and sexual pleasure, reading rec- 4 minutes  Lubrication, sexual positioning     PF drops  4 minutes 4 minutes W/ biofeedback (internal sensor) 10 minutes W/ manual palpation via vaginal canal x15 Dilator stretching                       THERAPEUTIC EXERCISE ( 15 minutes): Therapeutic exercises per grid below to improve patient flexibility and promote PFM relaxation and downtraining. Date:  3/26/2018 Date:  4/9/2018 Date:  4/23/2018   Activity/Exercise Parameters Parameters Parameters   Happy baby 3 x 30s 3 x 30s 3 x 30s   Child's pose 3 x 30s 3 x 30s    TA bracing  Supine 10x8s; verbal cuing for maintaining breathing    Cat/cow  3x10 (into child's pose)    Bridge with B hip abd   30 (red)   Single BKFO w/ resistance   20 (red)   S/L hip abduction   2x10       MANUAL THERAPY: ( 30 minutes):  Soft tissue mobilization was utilized and necessary because of the patient's restricted motion of soft tissue. Internal vaginal interventions performed with verbal consent. Date Type Location Comments   4/23/2018   Internal assessment/treatment Via vaginal canal SP, S/CS, strumming superficial and deep PF; 2nd vaginismus dilator    STM adductors Gentle skin rolling    STM Nerve glides/labial CTM Pudendal- inferior rectal and dorsal n to clitoris    STM Suprapubic/lower abdominals Skin rolling                       (Used abbreviations: MET - muscle energy technique; S/CS- Strain counter strain; CTM-Connective tissue mobilizations; C/R- Contract/relax; SP- Sustained pressure, TrP-Trigger point release, IASTM- Instrument assisted soft tissue mobilizations, TDN-Trigger point dry needling)    Treatment/Session Assessment:    · Response to Treatment: Pt with slightly increased tension in PFM today, which she attributes to stress this morning. She continues to have good control with drops with decreased tension. Pt was instructed in purpose and use of dilators for home stretching to continue at D/C. Initiation of hip strengthening today without increased complaints. Good attention and awareness of breath during movement. · Compliance with Program/Exercises: Pt reports compliance with HEP. · Recommendations/Intent for next treatment session: \"Next visit will focus on outcomes, objective measure, review dilator use and continue to hip strength\". Total Treatment Duration: 50 minutes  PT Patient Time In/Time Out  Time In: 0740  Time Out: [address] [facility].  Nix, PT, DPT

## 2018-04-30 ENCOUNTER — HOSPITAL ENCOUNTER (OUTPATIENT)
Dept: PHYSICAL THERAPY | Age: 33
Discharge: HOME | End: 2018-04-30
Attending: FAMILY MEDICINE
Payer: OTHER GOVERNMENT

## 2018-04-30 PROCEDURE — 97140 MANUAL THERAPY 1/> REGIONS: CPT

## 2018-04-30 PROCEDURE — 97110 THERAPEUTIC EXERCISES: CPT

## 2018-04-30 NOTE — THERAPY DISCHARGE
[facility]  : 1985  Primary: [place]  Secondary:  [address]  at [facility]  [address] , [address], [place] [address]  [phone]   [phone]        OUTPATIENT PHYSICAL THERAPY:Daily Note and Discharge 2018    ICD-10: Treatment Diagnosis: R27.8 Lack of coordination (muscle incoordination)  Precautions/Allergies:   Review of patient's allergies indicates no known allergies. Fall Risk Score: 0 (? 5 = High Risk)  MD Orders: eval and treat MEDICAL/REFERRING DIAGNOSIS:   Dyspareunia, female   DATE OF ONSET: ; after first child was born  [de-identified] PHYSICIAN: Jai Lin, DO  [address]: Not scheduled     INITIAL ASSESSMENT:  Ms. Finesse Rasmussen presents elevated pelvic floor muscle (PFM) activity and difficulty with coordination of PFM, likely leading with decreased sensation throughout during intercourse. Additionally she demonstrates moderate muscle and fascial restrictions of the abdomen with increased tension throughout. I believe she will benefit from skilled PT with an emphasis on pelvic floor down training, sensory input techniques, manual therapy, core stabilization and slow integration of hip stabilization over 8 weeks to improve PFM coordination and sensation and restore normal function of the pelvic floor. INTERVENTIONS PLANNED:  1. Electrical Stimulation  2. Home Exercise Program (HEP)  3. Manual Therapy  4. Neuromuscular Re-education/Strengthening  5. Range of Motion (ROM)  6. Therapeutic Activites  7. Therapeutic Exercise/Strengthening   TREATMENT PLAN:  Effective Dates: 3/15/2018 TO 2018 (60 days). Frequency/Duration: 1 time a week for 60 Days  GOALS: (Goals have been discussed and agreed upon with patient.)  Short-Term Functional Goals: Time Frame: 8 weeks  1. Pt will be I with HEP. (MET 2018)  2.  Pt will demonstrate normal voluntary relaxation of the pelvic floor muscle group for 30 seconds via sEMG (< 2.0mV) to improve pelvic floor ROM. (MET 2018)  3. Pt will increase hip strength to 4/5 for improved lumbopelvic stability and restore normal PFM function. (ONGOING 2018, discharged to [facility] to continue with hip strength)  Rehabilitation Potential For Stated Goals: Good                The information in this section was collected on 2018 (except where otherwise noted). HISTORY:   History of Present Injury/Illness (Reason for Referral):  Raffi Erwin is a 35 yo F that presents to PT w/ c/o decreased sensation during intercourse. Pt states that after her first child was born in  (via ) she began experiencing decreased sensation during intercourse. She states that intercourse is not painful, however she does not feel much deep during intercourse. She notes that she occasionally will experience sharp pain in the abdomen, however notes this only happens once every few months and goes away quickly. Urinary: urinary freq: 6-7x/day, rarely will awake to use restroom 1 time; awareness with urinary urge, able to put off urge \"for a long time\", denies ELISABET/UUI, dysuria, hesitancy; Fluid intake: water x48oz, coffee x16oz, occasional soda 1x/week  Bowel: bowel freq: 1x/day, [place] stool type 3, denies push/strain, no history or current management of constipation, denies pain and FI; awareness with bowel urge and able to put off urge as needed  Sexual: sexually active, no history of sexual abuse, pt denies pain with intercourse, however notes \"decreased sensation\" during intercourse since birth of first child in ; using lubrication with intercourse  Past Medical History/Comorbidities:   Ms. Solomon Friend  has no past medical history on file.   Ms. Solomon Friend  has a past surgical history that includes hx lap cholecystectomy; hx wisdom teeth extraction; hx  section; and hx other surgical.  (, , ), tummy tuck/liposuction (2017)  Social History/Living Environment: Lives at home with her  and 3 children, ages 10,10 & 3)  Prior Level of Function/Work/Activity:   Pt works full-time and sits at desk a lot during the day. Current Medications:     No current outpatient prescriptions on file. OTC supplements: fish oil, flax seed oil, \"water retention pills\", collagen/hair/nail vitamin   Date Last Reviewed:  4/30/2018   EXAMINATION:   Palpation:          Non-tender with manual palpation of superficial and deep PFM. Able to tolerate 3rd vaginismus dilator to full penetration; does report mild discomfort at R LA, however denies this during intercourse. ROM:          Pt able to contraction, relax and bulge PFM WNL. Sensation:  Sensory testing WNL, bilaterally   CLINICAL PRESENTATION:   CLINICAL DECISION MAKING:   Outcome Measure:   Pelvic Floor Distress Inventory - Short form (PFDI-20)  Score (out of 300) Initial: 3/15/2018  18/300 Most Recent: 4/30/2018  0/300     Interpretation of Score: This survey asks questions concerning certain bowel, bladder, or pelvic symptoms and how much these symptoms interfere with daily activities. Each section is scored on a 0-4 scale, 4 representing the greatest disability. The scores of each section (out of 100) are added together for a total score out of 300. Score 0 1-59  120-179 180-239 240-299 300   Modifier CH CI CJ CK CL CM CN ·           TREATMENT:   (In addition to Assessment/Re-Assessment sessions the following treatments were rendered)  Pre-treatment Symptoms/Complaints: Pt states that she is going to hold off on purchasing dilator for now. Does not have any questions in regards to use. Pain: Initial:   Pain Intensity 1: 0  Post Session: 0     THERAPEUTIC ACTIVITY: ( 0 minutes): Therapeutic activities per grid below to improve patient's understanding of the role of PFM in sexual function and contribting factors to deficits.      Date:  3/15/2018 Date:  3/20/2018 Date:  3/26/2018 Date:  4/2/2018 Date:   4/9/2018 Date:  4/23/2018   Activity/Exercise Parameters Parameters Parameters      Role of PFM in relation to bowel, bladder and sexual function 6 minutes        Diaphragmatic breathing (DB) 5 minutes        HEP D. Breathing & PFM drops- 4 minutes Varying position for drops, decrease leg crossing at desk Drops, DB, happy baby and child's pose      Sexual Health   Increasing libido and sexual pleasure, reading rec- 4 minutes  Lubrication, sexual positioning     PF drops  4 minutes 4 minutes W/ biofeedback (internal sensor) 10 minutes W/ manual palpation via vaginal canal x15 Dilator stretching                       THERAPEUTIC EXERCISE ( 15 minutes): Therapeutic exercises per grid below to improve patient flexibility and promote PFM relaxation and downtraining. Date:  3/26/2018 Date:  4/9/2018 Date:  4/23/2018 Date:   4/30/2018   Activity/Exercise Parameters Parameters Parameters    Happy baby 3 x 30s 3 x 30s 3 x 30s 3x30s   Child's pose 3 x 30s 3 x 30s     TA bracing  Supine 10x8s; verbal cuing for maintaining breathing     Cat/cow  3x10 (into child's pose)     Bridge with B hip abd   30 (red) 2x20 (red)   Single BKFO w/ resistance   20 (red)    S/L hip abduction   2x10 3x10   HEP    Update and reviewed; continue 4-6 weeks daily then decrease to 3x/week w/ continued focus on relaxation       MANUAL THERAPY: ( 35 minutes): Soft tissue mobilization was utilized and necessary because of the patient's restricted motion of soft tissue. Internal vaginal interventions performed with verbal consent.     Date Type Location Comments   4/30/2018   Internal assessment/treatment Via vaginal canal SP, S/CS, strumming superficial and deep PF; 3rd vaginismus dilator    STM adductors Gentle skin rolling    STM Nerve glides/labial CTM Pudendal- inferior rectal and dorsal n to clitoris    STM Suprapubic/lower abdominals Skin rolling                       (Used abbreviations: MET - muscle energy technique; S/CS- Strain counter strain; CTM-Connective tissue mobilizations; C/R- Contract/relax; SP- Sustained pressure, TrP-Trigger point release, IASTM- Instrument assisted soft tissue mobilizations, TDN-Trigger point dry needling)    Treatment/Session Assessment:    · Response to Treatment: Pt has made good progress and has met most goals at this time. She states that she can continue with hip strengthening I. She was encouraged to continue with PFM relaxation and recommended to purchase dilators for continued PFM downtraining. · Compliance with Program/Exercises: Pt reports compliance with HEP. Total Treatment Duration: 50 minutes  PT Patient Time In/Time Out  Time In: 2410  Time Out: [address].  Nix, PT, DPT

## 2018-05-07 ENCOUNTER — APPOINTMENT (OUTPATIENT)
Dept: PHYSICAL THERAPY | Age: 33
End: 2018-05-07
Attending: FAMILY MEDICINE

## 2018-08-08 ENCOUNTER — HOSPITAL ENCOUNTER (OUTPATIENT)
Dept: GENERAL RADIOLOGY | Age: 33
Discharge: HOME | End: 2018-08-08
Payer: OTHER GOVERNMENT

## 2018-08-08 DIAGNOSIS — G89.29 CHRONIC NECK PAIN: ICD-10-CM

## 2018-08-08 DIAGNOSIS — M54.2 CHRONIC NECK PAIN: ICD-10-CM

## 2018-08-08 PROCEDURE — 72050 X-RAY EXAM NECK SPINE 4/5VWS: CPT

## 2018-08-20 PROBLEM — Z51.81 ENCOUNTER FOR THERAPEUTIC DRUG MONITORING: Status: ACTIVE | Noted: 2018-08-20

## 2018-08-20 PROBLEM — Z79.891 ADMISSION FOR LONG-TERM OPIATE ANALGESIC USE: Status: ACTIVE | Noted: 2018-08-20

## 2019-01-22 ENCOUNTER — HOSPITAL ENCOUNTER (OUTPATIENT)
Dept: PHYSICAL THERAPY | Age: 34
Discharge: HOME | End: 2019-01-22
Payer: OTHER GOVERNMENT

## 2019-01-22 DIAGNOSIS — M54.2 CERVICALGIA: ICD-10-CM

## 2019-01-22 PROCEDURE — 97161 PT EVAL LOW COMPLEX 20 MIN: CPT

## 2019-01-22 NOTE — THERAPY EVALUATION
Sumit Soto Verónica  : 1985  [facility]  Secondary:  [address] at [address], [place], [address] [place] [address]  [phone]   [phone]       OUTPATIENT PHYSICAL THERAPY:Initial Assessment 2019    ICD-10: Treatment Diagnosis: cervicalgia (M54.2)  Treatment diagnosis 2: Pain in thoracic spine (M54.6)  Precautions: None  Allergies: Patient has no known allergies. MD Orders: evaluate and treat  MEDICAL/REFERRING DIAGNOSIS:  Cervicalgia [M54.2]   DATE OF ONSET: Neck pain since 2018  REFERRING PHYSICIAN: Courtney Gamez  RETURN PHYSICIAN APPOINTMENT: 2019     INITIAL ASSESSMENT:  Ms. Shahbaz Lopez is a 28 y.o. female presenting to physical therapy with complaints of neck pain that started in 2018. Patient reports no injury prior to onset of pain. Pain is located in mid cervical spine and occasionally more to the right side. Pain is increased with cervical flexion and extension, and occasionally with rotation. Episodes of pain are brief and are relieved when moving back into neutral cervical position. Patient reports no distal symptoms into upper extremities but occasional tightness and irritation into thoracic spine. Pain is 8/10 at worst and 1/10 at best with only tightness. Patient reports no headaches. Patient is a good candidate for skilled physical therapy services to include manual therapy, therapeutic exercise, and pain modalities as needed. PROBLEM LIST (Impacting functional limitations):  1. Decreased Strength  2. Decreased ADL/Functional Activities  3. Increased Pain  4. Increased Fatigue  5. Decreased Flexibility/Joint Mobility INTERVENTIONS PLANNED:  1. Cold  2. Cryotherapy  3. Electrical Stimulation  4. Heat  5. Home Exercise Program (HEP)  6. Manual Therapy  7. Neuromuscular Re-education/Strengthening  8. Range of Motion (ROM)  9. Therapeutic Activites  10.  Therapeutic Exercise/Strengthening  11. Transcutaneous Electrical Nerve Stimulation (TENS)  12. Ultrasound (US)  13. Therapeutic dry needling   TREATMENT PLAN:  Effective Dates: 1/22/2019 TO 2/21/2019 (30 days). Frequency/Duration: 1 time a week for 30 Days  GOALS: (Goals have been discussed and agreed upon with patient.)  Discharge Goals: Time Frame: 1/22/19 to 2/21/19  1. Patient will report no more than 3/10 pain with all active cervical motions and positions during ADL's.   2. Patient will be independent with home program to improve posture and cervical positioning. 3. Patient will improve cervical extension to 40 degrees with 0/10 pain. 4. Patient will improve cervical flexion to 40 degrees with 0/10 pain. 5. Patient will improve NDI score to 5/50 from 11/50. Rehabilitation Potential For Stated Goals: Good  Regarding [address] therapy, I certify that the treatment plan above will be carried out by a therapist or under their direction. Thank you for this referral,  Yoly Hinton PT     Referring Physician Signature: Denis Mccollum PA-C              Date                    The information in this section was collected on 1/22/19 (except where otherwise noted). HISTORY:   History of Present Injury/Illness (Reason for Referral):  Ms. Gama Najera is a 28 y.o. female presenting to physical therapy with complaints of neck pain that started in November 2018. Patient reports no injury prior to onset of pain. Pain is located in mid cervical spine and occasionally more to the right side. Pain is increased with cervical flexion and extension, and occasionally with rotation. Episodes of pain are brief and are relieved when moving back into neutral cervical position. Patient reports no distal symptoms into upper extremities but occasional tightness and irritation into thoracic spine. Pain is 8/10 at worst and 1/10 at best with only tightness. Patient reports no headaches.    Past Medical History/Comorbidities:   Ms. Shahbaz Lopez  has no past medical history on file. Ms. Shahbaz Lopez  has a past surgical history that includes hx lap cholecystectomy; hx wisdom teeth extraction; hx  section; and hx other surgical.  Social History/Living Environment:    Lives at home with 3 children  Prior Level of Function/Work/Activity:  Works full time at [employer] (moving and lifting boxes). Dominant Side:         RIGHT     Ambulatory/Rehab Services H2 Model Falls Risk Assessment    Risk Factors:       No Risk Factors Identified Ability to Rise from Chair:       (0)  Ability to rise in a single movement    Falls Prevention Plan:       No modifications necessary   Total: (5 or greater = High Risk): 0    © [facility] [employer]. All Rights Reserved. [facility] Patent #[id number]. Federal Law prohibits the replication, distribution or use without written permission from [facility]       Current Medications:       Current Outpatient Medications:     methocarbamol (ROBAXIN) 500 mg tablet, Take 1 Tab by mouth four (4) times daily. , Disp: 90 Tab, Rfl: 1    methyphenidate ER 27 mg 24 hr tab, Take 1 Tab (27 mg total) by mouth every morning. Fill date 19 Max Daily Amount: 27 mg, Disp: 30 Tab, Rfl: 0   Date Last Reviewed:  2019   Number of Personal Factors/Comorbidities that affect the Plan of Care:  (None) 0: LOW COMPLEXITY   EXAMINATION:   Observation/Orthostatic Postural Assessment:          Patient presents with mild forward head and rounded shoulders posture. Palpation:          Tender at right cervical paraspinals and cervical spinous processes. ROM:            Cervical AROM: (degrees)  Rotation: 55 bilaterally  Side bending: left=30, right=25  Extension: 20, with pain  Flexion: 20, with pain    Strength:            Gross strength in bilateral upper extremities is 4+/5.     Special Tests:            Spurling's=negative  Cervical compression=no pain and increase in symptoms  Cervical traction=stretching, relieving    Neurological Screen:        Myotomes:  Bilateral upper extremities normal        Dermatomes:  Intact for light touch and sensation    Functional Mobility:         Standing shoulder elevation: 175 degrees bilaterally     Body Structures Involved:  1. Bones  2. Joints  3. Muscles Body Functions Affected:  1. Sensory/Pain  2. Movement Related Activities and Participation Affected:  1. General Tasks and Demands  2. Mobility  3. Domestic Life  4. Community, Social and [place]   Number of elements (examined above) that affect the Plan of Care: 1-2: LOW COMPLEXITY   CLINICAL PRESENTATION:   Presentation: Stable and uncomplicated: LOW COMPLEXITY   CLINICAL DECISION MAKING:   Outcome Measure: Tool Used: Neck Disability Index (NDI)  Score:  Initial: 11/50  Most Recent: X/50 (Date: -- )   Interpretation of Score: The Neck Disability Index is a revised form of the Oswestry Low Back Pain Index and is designed to measure the activities of daily living in person's with neck pain. Each section is scored on a 0-5 scale, 5 representing the greatest disability. The scores of each section are added together for a total score of 50. Medical Necessity:   · Patient is expected to demonstrate progress in strength, range of motion, coordination and functional technique to decrease pain and increase tolerance for ADL's and cervical motions. · Skilled intervention continues to be required due to pain and limitation in cervical and thoracic spine. Reason for Services/Other Comments:  · Patient continues to require skilled intervention due to pain and limitation in cervical and thoracic spine.    Use of outcome tool(s) and clinical judgement create a POC that gives a:  (no co-morbidities, moderate pain, mild limitation on outcome measure) Clear prediction of patient's progress: LOW COMPLEXITY            TREATMENT:   (In addition to Assessment/Re-Assessment sessions the following treatments were rendered)  Pre-treatment Symptoms/Complaints:  Patient reports pain in cervical spine with flexion and extension movements that started in November 2018. Pain: Initial:   Pain Intensity 1: 2  Pain Location 1: Spine, cervical  Pain Orientation 1: Mid, Right  Post Session:  Patient reports 2/10 pain     Therapeutic Exercise: (15 Minutes):  Exercises per grid below to improve mobility, strength and coordination. Required minimal visual, verbal and manual cues to promote proper body alignment, promote proper body posture and promote proper body mechanics. Progressed resistance, range, repetitions and complexity of movement as indicated. Date:  1/22/19 Date:   Date:     Activity/Exercise Parameters Parameters Parameters   Chin tucks 1x10, supine     Upper trap stretch 1x20 sec, B     Rhomboid stretch [id number]     Levator stretch 20sec, B     Doorway stretch [id number], low                   Manual Therapy (    Soft Tissue Mobilization Duration  Duration: 10 Minutes): Manual cervical traction, soft tissue mobilization to cervical paraspinals, and manual stretching into cervical side bending in supine    Therapeutic Modalities: for pain and edema                                                                                               HEP: As above; handouts given to patient for all exercises. Treatment/Session Assessment:    · Response to Treatment:  Patient tolerated all treatments and exercises with no complaints. Patient showed good understanding of home program.  · Compliance with Program/Exercises: compliant most of the time. · Recommendations/Intent for next treatment session: \"Next visit will focus on advancements to more challenging activities\".     Total Treatment Duration: 55 minutes; 30 minute evaluation, 15 minutes exercise (no charge), 10 minutes manual therapy (no charge)    PT Patient Time In/Time Out  Time In: 7727  Time Out: [address] [facility],

## 2019-01-30 ENCOUNTER — HOSPITAL ENCOUNTER (OUTPATIENT)
Dept: PHYSICAL THERAPY | Age: 34
Discharge: HOME | End: 2019-01-30
Payer: OTHER GOVERNMENT

## 2019-01-30 PROCEDURE — 97110 THERAPEUTIC EXERCISES: CPT

## 2019-01-30 PROCEDURE — 97140 MANUAL THERAPY 1/> REGIONS: CPT

## 2019-01-30 PROCEDURE — 97014 ELECTRIC STIMULATION THERAPY: CPT

## 2019-01-30 NOTE — PROGRESS NOTES
Michelle Sanches  : 1985  Primary: [facility]  Secondary:  [address] at [facility]  [address] [place] [address], [place], [address] [place] [address]  [phone]   [phone]       OUTPATIENT PHYSICAL THERAPY:Daily Note 2019    ICD-10: Treatment Diagnosis: cervicalgia (M54.2)  Treatment diagnosis 2: Pain in thoracic spine (M54.6)  Precautions: None  Allergies: Patient has no known allergies. MD Orders: evaluate and treat  MEDICAL/REFERRING DIAGNOSIS:  Cervicalgia [M54.2]   DATE OF ONSET: Neck pain since 2018  REFERRING PHYSICIAN: Hayde Cade  RETURN PHYSICIAN APPOINTMENT: 2019     INITIAL ASSESSMENT:  Ms. Otilia Westbrook is a 28 y.o. female presenting to physical therapy with complaints of neck pain that started in 2018. Patient reports no injury prior to onset of pain. Pain is located in mid cervical spine and occasionally more to the right side. Pain is increased with cervical flexion and extension, and occasionally with rotation. Episodes of pain are brief and are relieved when moving back into neutral cervical position. Patient reports no distal symptoms into upper extremities but occasional tightness and irritation into thoracic spine. Pain is 8/10 at worst and 1/10 at best with only tightness. Patient reports no headaches. Patient is a good candidate for skilled physical therapy services to include manual therapy, therapeutic exercise, and pain modalities as needed. PROBLEM LIST (Impacting functional limitations):  1. Decreased Strength  2. Decreased ADL/Functional Activities  3. Increased Pain  4. Increased Fatigue  5. Decreased Flexibility/Joint Mobility INTERVENTIONS PLANNED:  1. Cold  2. Cryotherapy  3. Electrical Stimulation  4. Heat  5. Home Exercise Program (HEP)  6. Manual Therapy  7. Neuromuscular Re-education/Strengthening  8. Range of Motion (ROM)  9. Therapeutic Activites  10.  Therapeutic Exercise/Strengthening  11. Transcutaneous Electrical Nerve Stimulation (TENS)  12. Ultrasound (US)  13. Therapeutic dry needling   TREATMENT PLAN:  Effective Dates: 1/22/2019 TO 2/21/2019 (30 days). Frequency/Duration: 1 time a week for 30 Days  GOALS: (Goals have been discussed and agreed upon with patient.)  Discharge Goals: Time Frame: 1/22/19 to 2/21/19  1. Patient will report no more than 3/10 pain with all active cervical motions and positions during ADL's.   2. Patient will be independent with home program to improve posture and cervical positioning. 3. Patient will improve cervical extension to 40 degrees with 0/10 pain. 4. Patient will improve cervical flexion to 40 degrees with 0/10 pain. 5. Patient will improve NDI score to 5/50 from 11/50. Rehabilitation Potential For Stated Goals: Good  Regarding [address] therapy, I certify that the treatment plan above will be carried out by a therapist or under their direction. Thank you for this referral,  Nicki Becerra PT                 The information in this section was collected on 1/22/19 (except where otherwise noted). HISTORY:   History of Present Injury/Illness (Reason for Referral):  Ms. Brittney Grubbs is a 28 y.o. female presenting to physical therapy with complaints of neck pain that started in November 2018. Patient reports no injury prior to onset of pain. Pain is located in mid cervical spine and occasionally more to the right side. Pain is increased with cervical flexion and extension, and occasionally with rotation. Episodes of pain are brief and are relieved when moving back into neutral cervical position. Patient reports no distal symptoms into upper extremities but occasional tightness and irritation into thoracic spine. Pain is 8/10 at worst and 1/10 at best with only tightness. Patient reports no headaches. Past Medical History/Comorbidities:   Ms. Brittney Grubbs  has no past medical history on file.   Ms. Otilia Westbrook  has a past surgical history that includes hx lap cholecystectomy; hx wisdom teeth extraction; hx  section; and hx other surgical.  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 Dominant Side:         RIGHT     Ambulatory/Rehab Services H2 Model Falls Risk Assessment    Risk Factors:       No Risk Factors Identified Ability to Rise from Chair:       (0)  Ability to rise in a single movement    Falls Prevention Plan:       No modifications necessary   Total: (5 or greater = High Risk): 0     [facility] [employer]. All Rights Reserved. [facility] Patent #[id number]. Federal Law prohibits the replication, distribution or use without written permission from [facility] [employer]       Current Medications:       Current Outpatient Medications:     methocarbamol (ROBAXIN) 500 mg tablet, Take 1 Tab by mouth four (4) times daily. , Disp: 90 Tab, Rfl: 1    methyphenidate ER 27 mg 24 hr tab, Take 1 Tab (27 mg total) by mouth every morning. Fill date 19 Max Daily Amount: 27 mg, Disp: 30 Tab, Rfl: 0   Date Last Reviewed:  2019   Number of Personal Factors/Comorbidities that affect the Plan of Care:  (None) 0: LOW COMPLEXITY   EXAMINATION:   Observation/Orthostatic Postural Assessment:          Patient presents with mild forward head and rounded shoulders posture. Palpation:          Tender at right cervical paraspinals and cervical spinous processes. ROM:            Cervical AROM: (degrees)  Rotation: 55 bilaterally  Side bending: left=30, right=25  Extension: 20, with pain  Flexion: 20, with pain    Strength:            Gross strength in bilateral upper extremities is 4+/5.     Special Tests:            Spurling's=negative  Cervical compression=no pain and increase in symptoms  Cervical traction=stretching, relieving    Neurological Screen:        Myotomes: Bilateral upper extremities normal        Dermatomes:  Intact for light touch and sensation    Functional Mobility:         Standing shoulder elevation: 175 degrees bilaterally     Body Structures Involved:  1. Bones  2. Joints  3. Muscles Body Functions Affected:  1. Sensory/Pain  2. Movement Related Activities and Participation Affected:  1. General Tasks and Demands  2. Mobility  3. Domestic Life  4. Community, Social and [place]   Number of elements (examined above) that affect the Plan of Care: 1-2: LOW COMPLEXITY   CLINICAL PRESENTATION:   Presentation: Stable and uncomplicated: LOW COMPLEXITY   CLINICAL DECISION MAKING:   Outcome Measure: Tool Used: Neck Disability Index (NDI)  Score:  Initial: 11/50  Most Recent: X/50 (Date: -- )   Interpretation of Score: The Neck Disability Index is a revised form of the Oswestry Low Back Pain Index and is designed to measure the activities of daily living in person's with neck pain. Each section is scored on a 0-5 scale, 5 representing the greatest disability. The scores of each section are added together for a total score of 50. Medical Necessity:   · Patient is expected to demonstrate progress in strength, range of motion, coordination and functional technique to decrease pain and increase tolerance for ADL's and cervical motions. · Skilled intervention continues to be required due to pain and limitation in cervical and thoracic spine. Reason for Services/Other Comments:  · Patient continues to require skilled intervention due to pain and limitation in cervical and thoracic spine.    Use of outcome tool(s) and clinical judgement create a POC that gives a:  (no co-morbidities, moderate pain, mild limitation on outcome measure) Clear prediction of patient's progress: LOW COMPLEXITY            TREATMENT:   (In addition to Assessment/Re-Assessment sessions the following treatments were rendered)  Pre-treatment Symptoms/Complaints:  Patient reports no pain today and reports that home stretches are helping. Pain: Initial:   Pain Intensity 1: 0  Pain Location 1: Spine, cervical  Pain Orientation 1: Mid, Right  Post Session:  Patient reports 0/10 pain     Therapeutic Exercise: (15 Minutes):  Exercises per grid below to improve mobility, strength and coordination. Required minimal visual, verbal and manual cues to promote proper body alignment, promote proper body posture and promote proper body mechanics. Progressed resistance, range, repetitions and complexity of movement as indicated. Date:  1/22/19 Date:  1/30/19 Date:     Activity/Exercise Parameters Parameters Parameters   Chin tucks 1x10, supine 1x10, supine    Upper trap stretch 1x20 sec, B [id number], B    Rhomboid stretch [id number] [id number]    Levator stretch 20sec, B [id number], B    Doorway stretch 1e01iuv, low 5s80sth    Low rows  1 min, green    Pull aparts  1 min, red      Manual Therapy (    Soft Tissue Mobilization Duration  Duration: 25 Minutes): Manual cervical traction, soft tissue mobilization to cervical paraspinals, and manual stretching into cervical side bending in supine    Therapeutic Modalities: for pain and edema               Cervical Spine Heat  Type: Moist pack  Duration: 15 minutes  Patient Position: Supine                             Cervical Spine Electrical Stimulation  Type: (H wave)  Placement: cervical paraspinals and upper trapezius  # of Electrodes: 4  Duration : 15 minutes  Patient Position: Supine                                                 HEP: As above; handouts given to patient for all exercises. Treatment/Session Assessment:    · Response to Treatment:  Patient tolerated all treatments and exercises with no complaints. Added theraband exercises to home program.   · Compliance with Program/Exercises: compliant most of the time. · Recommendations/Intent for next treatment session: \"Next visit will focus on advancements to more challenging activities\".     Total Treatment Duration: 55 minutes    PT Patient Time In/Time Out  Time In: 0900  Time Out: [address], PT

## 2019-02-06 ENCOUNTER — HOSPITAL ENCOUNTER (OUTPATIENT)
Dept: PHYSICAL THERAPY | Age: 34
Discharge: HOME | End: 2019-02-06
Payer: OTHER GOVERNMENT

## 2019-02-06 PROCEDURE — 97014 ELECTRIC STIMULATION THERAPY: CPT

## 2019-02-06 PROCEDURE — 97110 THERAPEUTIC EXERCISES: CPT

## 2019-02-06 PROCEDURE — 97140 MANUAL THERAPY 1/> REGIONS: CPT

## 2019-02-06 NOTE — PROGRESS NOTES
Jc Sanches  : 1985  Primary: [facility]  Secondary:  [address] at [address], [place], [address] [place] [address]  [phone]   [phone]       OUTPATIENT PHYSICAL THERAPY:Daily Note 2019    ICD-10: Treatment Diagnosis: cervicalgia (M54.2)  Treatment diagnosis 2: Pain in thoracic spine (M54.6)  Precautions: None  Allergies: Patient has no known allergies. MD Orders: evaluate and treat  MEDICAL/REFERRING DIAGNOSIS:  Cervicalgia [M54.2]   DATE OF ONSET: Neck pain since 2018  REFERRING PHYSICIAN: Betzaida KRUEGER PHYSICIAN APPOINTMENT: 2019     INITIAL ASSESSMENT:  Ms. Ness Calderon is a 28 y.o. female presenting to physical therapy with complaints of neck pain that started in 2018. Patient reports no injury prior to onset of pain. Pain is located in mid cervical spine and occasionally more to the right side. Pain is increased with cervical flexion and extension, and occasionally with rotation. Episodes of pain are brief and are relieved when moving back into neutral cervical position. Patient reports no distal symptoms into upper extremities but occasional tightness and irritation into thoracic spine. Pain is 8/10 at worst and 1/10 at best with only tightness. Patient reports no headaches. Patient is a good candidate for skilled physical therapy services to include manual therapy, therapeutic exercise, and pain modalities as needed. PROBLEM LIST (Impacting functional limitations):  1. Decreased Strength  2. Decreased ADL/Functional Activities  3. Increased Pain  4. Increased Fatigue  5. Decreased Flexibility/Joint Mobility INTERVENTIONS PLANNED:  1. Cold  2. Cryotherapy  3. Electrical Stimulation  4. Heat  5. Home Exercise Program (HEP)  6. Manual Therapy  7. Neuromuscular Re-education/Strengthening  8. Range of Motion (ROM)  9. Therapeutic Activites  10.  Therapeutic Exercise/Strengthening  11. Transcutaneous Electrical Nerve Stimulation (TENS)  12. Ultrasound (US)  13. Therapeutic dry needling   TREATMENT PLAN:  Effective Dates: 1/22/2019 TO 2/21/2019 (30 days). Frequency/Duration: 1 time a week for 30 Days  GOALS: (Goals have been discussed and agreed upon with patient.)  Discharge Goals: Time Frame: 1/22/19 to 2/21/19  1. Patient will report no more than 3/10 pain with all active cervical motions and positions during ADL's.   2. Patient will be independent with home program to improve posture and cervical positioning. 3. Patient will improve cervical extension to 40 degrees with 0/10 pain. 4. Patient will improve cervical flexion to 40 degrees with 0/10 pain. 5. Patient will improve NDI score to 5/50 from 11/50. Rehabilitation Potential For Stated Goals: Good  Regarding [address] [facility] therapy, I certify that the treatment plan above will be carried out by a therapist or under their direction. Thank you for this referral,  Sakina Telles, PT                 The information in this section was collected on 1/22/19 (except where otherwise noted). HISTORY:   History of Present Injury/Illness (Reason for Referral):  Ms. Lissett Jessica is a 28 y.o. female presenting to physical therapy with complaints of neck pain that started in November 2018. Patient reports no injury prior to onset of pain. Pain is located in mid cervical spine and occasionally more to the right side. Pain is increased with cervical flexion and extension, and occasionally with rotation. Episodes of pain are brief and are relieved when moving back into neutral cervical position. Patient reports no distal symptoms into upper extremities but occasional tightness and irritation into thoracic spine. Pain is 8/10 at worst and 1/10 at best with only tightness. Patient reports no headaches. Past Medical History/Comorbidities:   Ms. Lissett Jessica  has no past medical history on file.   Ms. Ness Calderon  has a past surgical history that includes hx lap cholecystectomy; hx wisdom teeth extraction; hx  section; and hx other surgical.  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 Dominant Side:         RIGHT     Ambulatory/Rehab Services H2 Model Falls Risk Assessment    Risk Factors:       No Risk Factors Identified Ability to Rise from Chair:       (0)  Ability to rise in a single movement    Falls Prevention Plan:       No modifications necessary   Total: (5 or greater = High Risk): 0     [facility] [employer]. All Rights Reserved. [employer] Patent #[id number]. Federal Law prohibits the replication, distribution or use without written permission from [facility]       Current Medications:       Current Outpatient Medications:     methocarbamol (ROBAXIN) 500 mg tablet, Take 1 Tab by mouth four (4) times daily. , Disp: 90 Tab, Rfl: 1    methyphenidate ER 27 mg 24 hr tab, Take 1 Tab (27 mg total) by mouth every morning. Fill date 19 Max Daily Amount: 27 mg, Disp: 30 Tab, Rfl: 0   Date Last Reviewed:  2019   Number of Personal Factors/Comorbidities that affect the Plan of Care:  (None) 0: LOW COMPLEXITY   EXAMINATION:   Observation/Orthostatic Postural Assessment:          Patient presents with mild forward head and rounded shoulders posture. Palpation:          Tender at right cervical paraspinals and cervical spinous processes. ROM:            Cervical AROM: (degrees)  Rotation: 55 bilaterally  Side bending: left=30, right=25  Extension: 20, with pain  Flexion: 20, with pain    Strength:            Gross strength in bilateral upper extremities is 4+/5.     Special Tests:            Spurling's=negative  Cervical compression=no pain and increase in symptoms  Cervical traction=stretching, relieving    Neurological Screen:        Myotomes: Bilateral upper extremities normal        Dermatomes:  Intact for light touch and sensation    Functional Mobility:         Standing shoulder elevation: 175 degrees bilaterally     Body Structures Involved:  1. Bones  2. Joints  3. Muscles Body Functions Affected:  1. Sensory/Pain  2. Movement Related Activities and Participation Affected:  1. General Tasks and Demands  2. Mobility  3. Domestic Life  4. Community, Social and [place]   Number of elements (examined above) that affect the Plan of Care: 1-2: LOW COMPLEXITY   CLINICAL PRESENTATION:   Presentation: Stable and uncomplicated: LOW COMPLEXITY   CLINICAL DECISION MAKING:   Outcome Measure: Tool Used: Neck Disability Index (NDI)  Score:  Initial: 11/50  Most Recent: X/50 (Date: -- )   Interpretation of Score: The Neck Disability Index is a revised form of the Oswestry Low Back Pain Index and is designed to measure the activities of daily living in person's with neck pain. Each section is scored on a 0-5 scale, 5 representing the greatest disability. The scores of each section are added together for a total score of 50. Medical Necessity:   · Patient is expected to demonstrate progress in strength, range of motion, coordination and functional technique to decrease pain and increase tolerance for ADL's and cervical motions. · Skilled intervention continues to be required due to pain and limitation in cervical and thoracic spine. Reason for Services/Other Comments:  · Patient continues to require skilled intervention due to pain and limitation in cervical and thoracic spine.    Use of outcome tool(s) and clinical judgement create a POC that gives a:  (no co-morbidities, moderate pain, mild limitation on outcome measure) Clear prediction of patient's progress: LOW COMPLEXITY            TREATMENT:   (In addition to Assessment/Re-Assessment sessions the following treatments were rendered)  Pre-treatment Symptoms/Complaints:  Patient reports increased pain in cervical and thoracic spine over the weekend from work related duties. Patient has not performed stretches in the past few days. Pain: Initial:   Pain Intensity 1: 8  Pain Location 1: Spine, cervical, Spine, thoracic  Pain Orientation 1: Mid, Right  Post Session:  Patient reports 6/10 pain     Therapeutic Exercise: (15 Minutes):  Exercises per grid below to improve mobility, strength and coordination. Required minimal visual, verbal and manual cues to promote proper body alignment, promote proper body posture and promote proper body mechanics. Progressed resistance, range, repetitions and complexity of movement as indicated. Date:  1/22/19 Date:  1/30/19 Date:  2/6/19   Activity/Exercise Parameters Parameters Parameters   Chin tucks 1x10, supine 1x10, supine 1x10, supine   Upper trap stretch 1x20 sec, B 4f12epw, B 7i70icp, B   Rhomboid stretch [id number] 8b86eii   Levator stretch 20sec, B 0w51gsv, B 9r61fyb, B   Doorway stretch 0i88owx, low 6r55jyy 7b38ext   Low rows  1 min, green    Pull aparts  1 min, red      Manual Therapy (    Soft Tissue Mobilization Duration  Duration: 25 Minutes): Manual cervical traction, soft tissue mobilization to cervical paraspinals, and manual stretching into cervical side bending in supine, with soft tissue mobilization to thoracic paraspinals and upper trapezius in prone, with grade V thoracic mobilization in supine    Therapeutic Modalities: for pain and edema               Cervical Spine Heat  Type: Moist pack  Duration: 15 minutes  Patient Position: Supine                             Cervical Spine Electrical Stimulation  Type: (H wave)  Placement: upper trapezius and thoracic paraspinals  # of Electrodes: 4  Duration : 15 minutes  Patient Position: Supine                                                 HEP: As above; handouts given to patient for all exercises.     Treatment/Session Assessment:    · Response to Treatment:  Patient tolerated all treatments and exercises with no complaints. Patient reported good relief with thoracic mobilization and mild decrease in overall pain post session. · Compliance with Program/Exercises: compliant most of the time. · Recommendations/Intent for next treatment session: \"Next visit will focus on advancements to more challenging activities\".     Total Treatment Duration: 55 minutes    PT Patient Time In/Time Out  Time In: 0905  Time Out: [address], PT

## 2019-02-14 ENCOUNTER — HOSPITAL ENCOUNTER (OUTPATIENT)
Dept: PHYSICAL THERAPY | Age: 34
Discharge: HOME | End: 2019-02-14
Payer: OTHER GOVERNMENT

## 2019-02-14 PROCEDURE — 97140 MANUAL THERAPY 1/> REGIONS: CPT

## 2019-02-14 PROCEDURE — 97014 ELECTRIC STIMULATION THERAPY: CPT

## 2019-02-14 PROCEDURE — 97110 THERAPEUTIC EXERCISES: CPT

## 2019-02-14 NOTE — THERAPY RECERTIFICATION
[facility]  : 1985  Primary: [place]  Secondary:  [address] at [address], [place], [address] [place] [address]  [phone]   [phone]       OUTPATIENT PHYSICAL THERAPY:Daily Note, Progress Report and Recertification     ICD-10: Treatment Diagnosis: cervicalgia (M54.2)  Treatment diagnosis 2: Pain in thoracic spine (M54.6)  Precautions: None  Allergies: Patient has no known allergies. MD Orders: evaluate and treat  MEDICAL/REFERRING DIAGNOSIS:  Cervicalgia [M54.2]   DATE OF ONSET: Neck pain since 2018  REFERRING PHYSICIAN: Aloma Sandhoff  RETURN PHYSICIAN APPOINTMENT: 2019     INITIAL ASSESSMENT:  Ms. Caty Butts is a 28 y.o. female presenting to physical therapy with complaints of neck pain that started in 2018. Patient reports no injury prior to onset of pain. Pain is located in mid cervical spine and occasionally more to the right side. Pain is increased with cervical flexion and extension, and occasionally with rotation. Episodes of pain are brief and are relieved when moving back into neutral cervical position. Patient reports no distal symptoms into upper extremities but occasional tightness and irritation into thoracic spine. Pain is 8/10 at worst and 1/10 at best with only tightness. Patient reports no headaches. Patient has been seen for 4 sessions from 19 to 19. Patient has met goal for home exercise program and continues to make good progress towards goals for pain reduction, range of motion, and neck function. Patient reports continued pain in neck with work related duties but reports that stretches help to relieve pain. Patient continues to present with decrease range of motion in cervical spine and decreased overall function.   Patient is a good candidate for skilled physical therapy services to include manual therapy, therapeutic exercise, and pain modalities as needed. PROBLEM LIST (Impacting functional limitations):  1. Decreased Strength  2. Decreased ADL/Functional Activities  3. Increased Pain  4. Increased Fatigue  5. Decreased Flexibility/Joint Mobility INTERVENTIONS PLANNED:  1. Cold  2. Cryotherapy  3. Electrical Stimulation  4. Heat  5. Home Exercise Program (HEP)  6. Manual Therapy  7. Neuromuscular Re-education/Strengthening  8. Range of Motion (ROM)  9. Therapeutic Activites  10. Therapeutic Exercise/Strengthening  11. Transcutaneous Electrical Nerve Stimulation (TENS)  12. Ultrasound (US)  13. Therapeutic dry needling   TREATMENT PLAN:  Effective Dates: 2/14/19 to 3/16/19 (30 days). Frequency/Duration: RE-CERTIFICATION: Continue therapy at 1 time a week for 30 Days  GOALS: (Goals have been discussed and agreed upon with patient.)  Discharge Goals: Time Frame: 2/14/19 to 3/16/19  1. Patient will report no more than 3/10 pain with all active cervical motions and positions during ADL's. -ongoing  2. Patient will be independent with home program to improve posture and cervical positioning. -met  3. Patient will improve cervical extension to 40 degrees with 0/10 pain.-ongong  4. Patient will improve cervical flexion to 40 degrees with 0/10 pain.-ongoing  5. Patient will improve NDI score to 5/50 from 11/50. -ongoing  Rehabilitation Potential For Stated Goals: Good  Regarding [facility] therapy, I certify that the treatment plan above will be carried out by a therapist or under their direction. Thank you for this referral,  Isael Martin PT     Referring Physician Signature: Farida Templeton PA-C              Date                        The information in this section was collected on 2/14/19 (except where otherwise noted). HISTORY:   History of Present Injury/Illness (Reason for Referral):  Ms. Darlis Nissen is a 28 y.o. female presenting to physical therapy with complaints of neck pain that started in November 2018.   Patient reports no injury prior to onset of pain. Pain is located in mid cervical spine and occasionally more to the right side. Pain is increased with cervical flexion and extension, and occasionally with rotation. Episodes of pain are brief and are relieved when moving back into neutral cervical position. Patient reports no distal symptoms into upper extremities but occasional tightness and irritation into thoracic spine. Pain is 8/10 at worst and 1/10 at best with only tightness. Patient reports no headaches. Past Medical History/Comorbidities:   Ms. Shannan Radford  has no past medical history on file. Ms. Shannan Radford  has a past surgical history that includes hx lap cholecystectomy; hx wisdom teeth extraction; hx  section; and hx other surgical.  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 Dominant Side:         RIGHT     Ambulatory/Rehab Services H2 Model Falls Risk Assessment    Risk Factors:       No Risk Factors Identified Ability to Rise from Chair:       (0)  Ability to rise in a single movement    Falls Prevention Plan:       No modifications necessary   Total: (5 or greater = High Risk): 0    © [facility] [employer]. All Rights Reserved. [facility] Patent #[id number]. Federal Law prohibits the replication, distribution or use without written permission from [facility] [employer]       Current Medications:       Current Outpatient Medications:     methocarbamol (ROBAXIN) 500 mg tablet, Take 1 Tab by mouth four (4) times daily. , Disp: 90 Tab, Rfl: 1    methyphenidate ER 27 mg 24 hr tab, Take 1 Tab (27 mg total) by mouth every morning.   Fill date 19 Max Daily Amount: 27 mg, Disp: 30 Tab, Rfl: 0   Date Last Reviewed:  2019   Number of Personal Factors/Comorbidities that affect the Plan of Care:  (None) 0: LOW COMPLEXITY   EXAMINATION: Observation/Orthostatic Postural Assessment:          Patient presents with mild forward head and rounded shoulders posture. Palpation:          Tender at right cervical paraspinals and cervical spinous processes. ROM:            Cervical AROM: (degrees)  Rotation: 55 bilaterally  Side bending: left=30, right=25  Extension: 20, with pain  Flexion: 25, with pain    Strength:            Gross strength in bilateral upper extremities is 4+/5. Special Tests:            Spurling's=negative  Cervical compression=no pain or increase in symptoms  Cervical traction=stretching, relieving    Neurological Screen:        Myotomes:  Bilateral upper extremities normal        Dermatomes:  Intact for light touch and sensation    Functional Mobility:         Standing shoulder elevation: 175 degrees bilaterally     Body Structures Involved:  1. Bones  2. Joints  3. Muscles Body Functions Affected:  1. Sensory/Pain  2. Movement Related Activities and Participation Affected:  1. General Tasks and Demands  2. Mobility  3. Domestic Life  4. Community, Social and [place]   Number of elements (examined above) that affect the Plan of Care: 1-2: LOW COMPLEXITY   CLINICAL PRESENTATION:   Presentation: Stable and uncomplicated: LOW COMPLEXITY   CLINICAL DECISION MAKING:   Outcome Measure: Tool Used: Neck Disability Index (NDI)  Score:  Initial: 11/50  Most Recent: 9/50 (Date: 2/14/19 )   Interpretation of Score: The Neck Disability Index is a revised form of the Oswestry Low Back Pain Index and is designed to measure the activities of daily living in person's with neck pain. Each section is scored on a 0-5 scale, 5 representing the greatest disability. The scores of each section are added together for a total score of 50.        Medical Necessity:   · Patient is expected to demonstrate progress in strength, range of motion, coordination and functional technique to decrease pain and increase tolerance for ADL's and cervical motions. · Skilled intervention continues to be required due to pain and limitation in cervical and thoracic spine. Reason for Services/Other Comments:  · Patient continues to require skilled intervention due to pain and limitation in cervical and thoracic spine. 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 Pain: Initial:   Pain Intensity 1: 5  Pain Location 1: Spine, cervical  Pain Orientation 1: Mid  Post Session:  Patient reports 3/10 pain     Therapeutic Exercise: (15 Minutes):  Exercises per grid below to improve mobility, strength and coordination. Required minimal visual, verbal and manual cues to promote proper body alignment, promote proper body posture and promote proper body mechanics. Progressed resistance, range, repetitions and complexity of movement as indicated.    Date:  2/14/19 Date:  1/30/19 Date:  2/6/19   Activity/Exercise Parameters Parameters Parameters   Chin tucks  1x10, supine 1x10, supine   Upper trap stretch 2x20 sec, B 3r56mez, B 2k59dgb, B   Rhomboid stretch 4l49lah 3v75ban 4k34ngz   Levator stretch 8z77vog, B 4y46tgu, B 6b92zwd, B   Doorway stretch 9f64bbl, low 5q94jli 2k91xcx   Low rows  1 min, green    Pull aparts  1 min, red      Manual Therapy (    Soft Tissue Mobilization Duration  Duration: 15 Minutes): Manual cervical traction, soft tissue mobilization to cervical paraspinals, and manual stretching into cervical side bending in supine, with grade V thoracic mobilization in supine    Therapeutic Modalities: for pain and edema               Cervical Spine Heat  Type: Moist pack  Duration: 15 minutes  Patient Position: Supine                             Cervical Spine Electrical Stimulation  Type: (H wave)  Placement: cervical paraspinals and upper trapezius  # of Electrodes: 4  Duration : 15 minutes  Patient Position: Supine                                                 HEP: As above; handouts given to patient for all exercises. Treatment/Session Assessment:    · Response to Treatment:  Patient continues to tolerate all treatments and exercises, and reports good relief with manual therapy and stretches. Patient to continue with home program until authorization is received for continued therapy. · Compliance with Program/Exercises: compliant most of the time. · Recommendations/Intent for next treatment session: \"Next visit will focus on advancements to more challenging activities\". · Re-certification: Continue therapy at 1 time a week for 30 days from 2/14/19 to 3/16/19 to meet above goals.     Total Treatment Duration: 45 minutes    PT Patient Time In/Time Out  Time In: 0910  Time Out: [address], PT

## 2019-02-28 ENCOUNTER — HOSPITAL ENCOUNTER (OUTPATIENT)
Dept: PHYSICAL THERAPY | Age: 34
Discharge: HOME | End: 2019-02-28
Payer: OTHER GOVERNMENT

## 2019-02-28 PROCEDURE — 97110 THERAPEUTIC EXERCISES: CPT

## 2019-02-28 PROCEDURE — 97014 ELECTRIC STIMULATION THERAPY: CPT

## 2019-02-28 PROCEDURE — 97140 MANUAL THERAPY 1/> REGIONS: CPT

## 2019-02-28 NOTE — PROGRESS NOTES
Keith Sanches  : 1985  Primary: [facility]  Secondary:  [address] at [address], [address]  [phone]   [phone]       OUTPATIENT PHYSICAL THERAPY:Daily Note 2019    ICD-10: Treatment Diagnosis: cervicalgia (M54.2)  Treatment diagnosis 2: Pain in thoracic spine (M54.6)  Precautions: None  Allergies: Patient has no known allergies. MD Orders: evaluate and treat  MEDICAL/REFERRING DIAGNOSIS:  Cervicalgia [M54.2]   DATE OF ONSET: Neck pain since 2018  REFERRING PHYSICIAN: Berlin Katz  RETURN PHYSICIAN APPOINTMENT: 2019     INITIAL ASSESSMENT:  Ms. Sol Babin is a 28 y.o. female presenting to physical therapy with complaints of neck pain that started in 2018. Patient reports no injury prior to onset of pain. Pain is located in mid cervical spine and occasionally more to the right side. Pain is increased with cervical flexion and extension, and occasionally with rotation. Episodes of pain are brief and are relieved when moving back into neutral cervical position. Patient reports no distal symptoms into upper extremities but occasional tightness and irritation into thoracic spine. Pain is 8/10 at worst and 1/10 at best with only tightness. Patient reports no headaches. Patient has been seen for 4 sessions from 19 to 19. Patient has met goal for home exercise program and continues to make good progress towards goals for pain reduction, range of motion, and neck function. Patient reports continued pain in neck with work related duties but reports that stretches help to relieve pain. Patient continues to present with decrease range of motion in cervical spine and decreased overall function. Patient is a good candidate for skilled physical therapy services to include manual therapy, therapeutic exercise, and pain modalities as needed.      PROBLEM LIST (Impacting functional limitations):  1. Decreased Strength  2. Decreased ADL/Functional Activities  3. Increased Pain  4. Increased Fatigue  5. Decreased Flexibility/Joint Mobility INTERVENTIONS PLANNED:  1. Cold  2. Cryotherapy  3. Electrical Stimulation  4. Heat  5. Home Exercise Program (HEP)  6. Manual Therapy  7. Neuromuscular Re-education/Strengthening  8. Range of Motion (ROM)  9. Therapeutic Activites  10. Therapeutic Exercise/Strengthening  11. Transcutaneous Electrical Nerve Stimulation (TENS)  12. Ultrasound (US)  13. Therapeutic dry needling   TREATMENT PLAN:  Effective Dates: 2/14/19 to 3/16/19 (30 days). Frequency/Duration: RE-CERTIFICATION: Continue therapy at 1 time a week for 30 Days  GOALS: (Goals have been discussed and agreed upon with patient.)  Discharge Goals: Time Frame: 2/14/19 to 3/16/19  1. Patient will report no more than 3/10 pain with all active cervical motions and positions during ADL's. -ongoing  2. Patient will be independent with home program to improve posture and cervical positioning. -met  3. Patient will improve cervical extension to 40 degrees with 0/10 pain.-ongong  4. Patient will improve cervical flexion to 40 degrees with 0/10 pain.-ongoing  5. Patient will improve NDI score to 5/50 from 11/50. -ongoing  Rehabilitation Potential For Stated Goals: Good  Regarding [address] therapy, I certify that the treatment plan above will be carried out by a therapist or under their direction. Thank you for this referral,  Bessy Lino, PT                   The information in this section was collected on 2/14/19 (except where otherwise noted). HISTORY:   History of Present Injury/Illness (Reason for Referral):  Ms. Twila Garcia is a 28 y.o. female presenting to physical therapy with complaints of neck pain that started in November 2018. Patient reports no injury prior to onset of pain. Pain is located in mid cervical spine and occasionally more to the right side. Pain is increased with cervical flexion and extension, and occasionally with rotation. Episodes of pain are brief and are relieved when moving back into neutral cervical position. Patient reports no distal symptoms into upper extremities but occasional tightness and irritation into thoracic spine. Pain is 8/10 at worst and 1/10 at best with only tightness. Patient reports no headaches. Past Medical History/Comorbidities:   Ms. Valdez Coubharathi  has no past medical history on file. Ms. Courtney Miller  has a past surgical history that includes hx lap cholecystectomy; hx wisdom teeth extraction; hx  section; and hx other surgical.  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 Dominant Side:         RIGHT     Ambulatory/Rehab Services H2 Model Falls Risk Assessment    Risk Factors:       No Risk Factors Identified Ability to Rise from Chair:       (0)  Ability to rise in a single movement    Falls Prevention Plan:       No modifications necessary   Total: (5 or greater = High Risk): 0    © [facility] of [employer]. All Rights Reserved. [facility] Patent #[id number]. Federal Law prohibits the replication, distribution or use without written permission from [facility] [employer]       Current Medications:       Current Outpatient Medications:     methocarbamol (ROBAXIN) 500 mg tablet, Take 1 Tab by mouth four (4) times daily. , Disp: 90 Tab, Rfl: 1    methyphenidate ER 27 mg 24 hr tab, Take 1 Tab (27 mg total) by mouth every morning. Fill date 19 Max Daily Amount: 27 mg, Disp: 30 Tab, Rfl: 0   Date Last Reviewed:  2019   Number of Personal Factors/Comorbidities that affect the Plan of Care:  (None) 0: LOW COMPLEXITY   EXAMINATION:   Observation/Orthostatic Postural Assessment:          Patient presents with mild forward head and rounded shoulders posture.   Palpation: Tender at right cervical paraspinals and cervical spinous processes. ROM:            Cervical AROM: (degrees)  Rotation: 55 bilaterally  Side bending: left=30, right=25  Extension: 20, with pain  Flexion: 25, with pain    Strength:            Gross strength in bilateral upper extremities is 4+/5. Special Tests:            Spurling's=negative  Cervical compression=no pain or increase in symptoms  Cervical traction=stretching, relieving    Neurological Screen:        Myotomes:  Bilateral upper extremities normal        Dermatomes:  Intact for light touch and sensation    Functional Mobility:         Standing shoulder elevation: 175 degrees bilaterally     Body Structures Involved:  1. Bones  2. Joints  3. Muscles Body Functions Affected:  1. Sensory/Pain  2. Movement Related Activities and Participation Affected:  1. General Tasks and Demands  2. Mobility  3. Domestic Life  4. Community, Social and [place]   Number of elements (examined above) that affect the Plan of Care: 1-2: LOW COMPLEXITY   CLINICAL PRESENTATION:   Presentation: Stable and uncomplicated: LOW COMPLEXITY   CLINICAL DECISION MAKING:   Outcome Measure: Tool Used: Neck Disability Index (NDI)  Score:  Initial: 11/50  Most Recent: 9/50 (Date: 2/14/19 )   Interpretation of Score: The Neck Disability Index is a revised form of the Oswestry Low Back Pain Index and is designed to measure the activities of daily living in person's with neck pain. Each section is scored on a 0-5 scale, 5 representing the greatest disability. The scores of each section are added together for a total score of 50. Medical Necessity:   · Patient is expected to demonstrate progress in strength, range of motion, coordination and functional technique to decrease pain and increase tolerance for ADL's and cervical motions. · Skilled intervention continues to be required due to pain and limitation in cervical and thoracic spine.   Reason for Services/Other Comments:  · Patient continues to require skilled intervention due to pain and limitation in cervical and thoracic spine. 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 Patient continues to report good relief with home stretches/exercises along with heat/cold. Pain: Initial:   Pain Intensity 1: 6  Pain Location 1: Spine, cervical  Pain Orientation 1: Mid  Post Session:  Patient reports 7/10 pain/soreness     Therapeutic Exercise: (10 Minutes):  Exercises per grid below to improve mobility, strength and coordination. Required minimal visual, verbal and manual cues to promote proper body alignment, promote proper body posture and promote proper body mechanics. Progressed resistance, range, repetitions and complexity of movement as indicated.    Date:  2/14/19 Date:  2/28/19 Date:  2/6/19   Activity/Exercise Parameters Parameters Parameters   Chin tucks   1x10, supine   Upper trap stretch 2x20 sec, B  6h55zwq, B   Rhomboid stretch 2u78gmq  6r33swv   Levator stretch 8l85vad, B  2n88wrw, B   Doorway stretch 4n39qjd, low  7m00pcz   Low rows      Head lifts  10x5 sec, lateral, B    Pull aparts      Time spent discussing posture and home exercises/stretches    Manual Therapy (    Soft Tissue Mobilization Duration  Duration: 30 Minutes): Manual cervical traction, soft tissue mobilization to cervical paraspinals, and manual stretching into cervical side bending in supine, with therapeutic dry needling to paravertebrals C4,C5 and bilateral spinal accessory points in prone    Therapeutic Modalities: for pain and edema               Cervical Spine Heat  Type: Moist pack  Duration: 15 minutes  Patient Position: Supine                             Cervical Spine Electrical Stimulation  Type: (H wave)  Placement: cervical paraspinals and upper trapezius  # of Electrodes: 4  Duration : 15 minutes  Patient Position: Supine                                                 HEP: As above; handouts given to patient for all exercises. Treatment/Session Assessment:    · Response to Treatment:  Patient reported increased pressure and irritation during dry needling to cervical spine but no issue with needling to shoulders. Patient reported slight dizziness following dry needling but symptoms subsided after a minute or two. Patient reported no issues or discomfort with manual therapy. Added head lifts to home program.  Patient reports soreness in cervical spine following session. · Compliance with Program/Exercises: compliant most of the time. · Recommendations/Intent for next treatment session: \"Next visit will focus on advancements to more challenging activities\". · Re-certification: Continue therapy at 1 time a week for 30 days from 2/14/19 to 3/16/19 to meet above goals.     Total Treatment Duration: 55 minutes    PT Patient Time In/Time Out  Time In: 0705  Time Out: 0800    Star Huizar PT

## 2019-03-05 ENCOUNTER — HOSPITAL ENCOUNTER (OUTPATIENT)
Dept: PHYSICAL THERAPY | Age: 34
Discharge: HOME | End: 2019-03-05
Payer: OTHER GOVERNMENT

## 2019-03-05 PROCEDURE — 97014 ELECTRIC STIMULATION THERAPY: CPT

## 2019-03-05 PROCEDURE — 97110 THERAPEUTIC EXERCISES: CPT

## 2019-03-05 PROCEDURE — 97140 MANUAL THERAPY 1/> REGIONS: CPT

## 2019-03-05 NOTE — PROGRESS NOTES
Mary Sanches  : 1985  Primary: [facility]  Secondary:  [address] at [address], [address] [place] [address] Street  [phone]   [phone]       OUTPATIENT PHYSICAL THERAPY:Daily Note 3/5/2019    ICD-10: Treatment Diagnosis: cervicalgia (M54.2)  Treatment diagnosis 2: Pain in thoracic spine (M54.6)  Precautions: None  Allergies: Patient has no known allergies. MD Orders: evaluate and treat  MEDICAL/REFERRING DIAGNOSIS:  Cervicalgia [M54.2]   DATE OF ONSET: Neck pain since 2018  REFERRING PHYSICIAN: Beto Sprain  RETURN PHYSICIAN APPOINTMENT: 2019     INITIAL ASSESSMENT:  Ms. Brittney Grubbs is a 28 y.o. female presenting to physical therapy with complaints of neck pain that started in 2018. Patient reports no injury prior to onset of pain. Pain is located in mid cervical spine and occasionally more to the right side. Pain is increased with cervical flexion and extension, and occasionally with rotation. Episodes of pain are brief and are relieved when moving back into neutral cervical position. Patient reports no distal symptoms into upper extremities but occasional tightness and irritation into thoracic spine. Pain is 8/10 at worst and 1/10 at best with only tightness. Patient reports no headaches. Patient has been seen for 4 sessions from 19 to 19. Patient has met goal for home exercise program and continues to make good progress towards goals for pain reduction, range of motion, and neck function. Patient reports continued pain in neck with work related duties but reports that stretches help to relieve pain. Patient continues to present with decrease range of motion in cervical spine and decreased overall function. Patient is a good candidate for skilled physical therapy services to include manual therapy, therapeutic exercise, and pain modalities as needed.      PROBLEM LIST (Impacting functional limitations):  1. Decreased Strength  2. Decreased ADL/Functional Activities  3. Increased Pain  4. Increased Fatigue  5. Decreased Flexibility/Joint Mobility INTERVENTIONS PLANNED:  1. Cold  2. Cryotherapy  3. Electrical Stimulation  4. Heat  5. Home Exercise Program (HEP)  6. Manual Therapy  7. Neuromuscular Re-education/Strengthening  8. Range of Motion (ROM)  9. Therapeutic Activites  10. Therapeutic Exercise/Strengthening  11. Transcutaneous Electrical Nerve Stimulation (TENS)  12. Ultrasound (US)  13. Therapeutic dry needling   TREATMENT PLAN:  Effective Dates: 2/14/19 to 3/16/19 (30 days). Frequency/Duration: RE-CERTIFICATION: Continue therapy at 1 time a week for 30 Days  GOALS: (Goals have been discussed and agreed upon with patient.)  Discharge Goals: Time Frame: 2/14/19 to 3/16/19  1. Patient will report no more than 3/10 pain with all active cervical motions and positions during ADL's. -ongoing  2. Patient will be independent with home program to improve posture and cervical positioning. -met  3. Patient will improve cervical extension to 40 degrees with 0/10 pain.-ongong  4. Patient will improve cervical flexion to 40 degrees with 0/10 pain.-ongoing  5. Patient will improve NDI score to 5/50 from 11/50. -ongoing  Rehabilitation Potential For Stated Goals: Good  Regarding [address] [facility] therapy, I certify that the treatment plan above will be carried out by a therapist or under their direction. Thank you for this referral,  Gabriela Montague, PT                   The information in this section was collected on 2/14/19 (except where otherwise noted). HISTORY:   History of Present Injury/Illness (Reason for Referral):  Ms. Wei Walker is a 28 y.o. female presenting to physical therapy with complaints of neck pain that started in November 2018. Patient reports no injury prior to onset of pain. Pain is located in mid cervical spine and occasionally more to the right side. Pain is increased with cervical flexion and extension, and occasionally with rotation. Episodes of pain are brief and are relieved when moving back into neutral cervical position. Patient reports no distal symptoms into upper extremities but occasional tightness and irritation into thoracic spine. Pain is 8/10 at worst and 1/10 at best with only tightness. Patient reports no headaches. Past Medical History/Comorbidities:   Ms. Rafael Subramanian  has no past medical history on file. Ms. Rafael Subramanian  has a past surgical history that includes hx lap cholecystectomy; hx wisdom teeth extraction; hx  section; and hx other surgical.  Social History/Living Environment:    Lives at home with 3 children  Prior Level of Function/Work/Activity:  Works full time at [employer] (moving and lifting boxes). Dominant Side:         RIGHT     Ambulatory/Rehab Services H2 Model Falls Risk Assessment    Risk Factors:       No Risk Factors Identified Ability to Rise from Chair:       (0)  Ability to rise in a single movement    Falls Prevention Plan:       No modifications necessary   Total: (5 or greater = High Risk): 0    © [facility] [employer]. All Rights Reserved. [facility] Patent #[id number]. Federal Law prohibits the replication, distribution or use without written permission from [facility] [employer]       Current Medications:       Current Outpatient Medications:     methocarbamol (ROBAXIN) 500 mg tablet, Take 1 Tab by mouth four (4) times daily. , Disp: 90 Tab, Rfl: 1    methyphenidate ER 27 mg 24 hr tab, Take 1 Tab (27 mg total) by mouth every morning. Fill date 19 Max Daily Amount: 27 mg, Disp: 30 Tab, Rfl: 0   Date Last Reviewed:  3/5/2019   Number of Personal Factors/Comorbidities that affect the Plan of Care:  (None) 0: LOW COMPLEXITY   EXAMINATION:   Observation/Orthostatic Postural Assessment:          Patient presents with mild forward head and rounded shoulders posture.   Palpation: Tender at right cervical paraspinals and cervical spinous processes. ROM:            Cervical AROM: (degrees)  Rotation: 55 bilaterally  Side bending: left=30, right=25  Extension: 20, with pain  Flexion: 25, with pain    Strength:            Gross strength in bilateral upper extremities is 4+/5. Special Tests:            Spurling's=negative  Cervical compression=no pain or increase in symptoms  Cervical traction=stretching, relieving    Neurological Screen:        Myotomes:  Bilateral upper extremities normal        Dermatomes:  Intact for light touch and sensation    Functional Mobility:         Standing shoulder elevation: 175 degrees bilaterally     Body Structures Involved:  1. Bones  2. Joints  3. Muscles Body Functions Affected:  1. Sensory/Pain  2. Movement Related Activities and Participation Affected:  1. General Tasks and Demands  2. Mobility  3. Domestic Life  4. Community, Social and [place]   Number of elements (examined above) that affect the Plan of Care: 1-2: LOW COMPLEXITY   CLINICAL PRESENTATION:   Presentation: Stable and uncomplicated: LOW COMPLEXITY   CLINICAL DECISION MAKING:   Outcome Measure: Tool Used: Neck Disability Index (NDI)  Score:  Initial: 11/50  Most Recent: 9/50 (Date: 2/14/19 )   Interpretation of Score: The Neck Disability Index is a revised form of the Oswestry Low Back Pain Index and is designed to measure the activities of daily living in person's with neck pain. Each section is scored on a 0-5 scale, 5 representing the greatest disability. The scores of each section are added together for a total score of 50. Medical Necessity:   · Patient is expected to demonstrate progress in strength, range of motion, coordination and functional technique to decrease pain and increase tolerance for ADL's and cervical motions. · Skilled intervention continues to be required due to pain and limitation in cervical and thoracic spine.   Reason for Services/Other Comments:  · Patient continues to require skilled intervention due to pain and limitation in cervical and thoracic spine. Use of outcome tool(s) and clinical judgement create a POC that gives a:  (no co-morbidities, moderate pain, mild limitation on outcome measure) Clear prediction of patient's progress: LOW COMPLEXITY            TREATMENT:   (In addition to Assessment/Re-Assessment sessions the following treatments were rendered)  Pre-treatment Symptoms/Complaints:  Patient reports mild to moderate neck pain today and overall decreased pain since last session. Patient reports good relief following last session with dry needling and would like to try it again today. Pain: Initial:   Pain Intensity 1: 4  Pain Location 1: Spine, cervical  Pain Orientation 1: Mid  Post Session:  Patient reports 3/10 pain/soreness     Therapeutic Exercise: (15 Minutes):  Exercises per grid below to improve mobility, strength and coordination. Required minimal visual, verbal and manual cues to promote proper body alignment, promote proper body posture and promote proper body mechanics. Progressed resistance, range, repetitions and complexity of movement as indicated.    Date:  2/14/19 Date:  2/28/19 Date:  3/5/19   Activity/Exercise Parameters Parameters Parameters   Chin tucks      Upper trap stretch 2x20 sec, B  1t67suw, B   Rhomboid stretch 5a23ing  [id number]   Levator stretch [id number], B  9d14rej, B   Doorway stretch 2e09kga, low  3a19aqy   Low rows   1x10, green   Bilateral external rotation   1x10, green   Head lifts  10x5 sec, lateral, B    Pull aparts          Manual Therapy (    Soft Tissue Mobilization Duration  Duration: 25 Minutes): Manual cervical traction, soft tissue mobilization to cervical paraspinals, and manual stretching into cervical side bending in supine, with therapeutic dry needling to paravertebrals C4,C5, C6 and bilateral spinal accessory points in prone, and soft tissue mobilization to left upper trapezius and thoracic paraspinals in sitting    Therapeutic Modalities: for pain and edema               Cervical Spine Heat  Type: Moist pack  Duration: 15 minutes  Patient Position: Supine                             Cervical Spine Electrical Stimulation  Type: (H wave)  Placement: cervical paraspinals and upper trapezius  # of Electrodes: 4  Duration : 15 minutes  Patient Position: Supine                                                 HEP: As above; handouts given to patient for all exercises. Treatment/Session Assessment:    · Response to Treatment:  Patient continues to report increased pressure and tenderness in neck with dry needling and reported increase irritation in left upper trapezius and thoracic spine following needling. Patient reported decrease pain and irritation in left shoulder and thoracic spine following soft tissue mobilization. Patient reports overall good relief post session. · Compliance with Program/Exercises: compliant most of the time. · Recommendations/Intent for next treatment session: \"Next visit will focus on advancements to more challenging activities\". · Re-certification: Continue therapy at 1 time a week for 30 days from 2/14/19 to 3/16/19 to meet above goals.     Total Treatment Duration: 55 minutes    PT Patient Time In/Time Out  Time In: 1005  Time Out: [address], PT

## 2019-03-13 ENCOUNTER — HOSPITAL ENCOUNTER (OUTPATIENT)
Dept: PHYSICAL THERAPY | Age: 34
Discharge: HOME | End: 2019-03-13
Payer: OTHER GOVERNMENT

## 2019-03-13 PROCEDURE — 97110 THERAPEUTIC EXERCISES: CPT

## 2019-03-13 PROCEDURE — 97140 MANUAL THERAPY 1/> REGIONS: CPT

## 2019-03-13 PROCEDURE — 97014 ELECTRIC STIMULATION THERAPY: CPT

## 2019-03-13 NOTE — PROGRESS NOTES
Micky Sanches  : 1985  Primary: [facility]  Secondary:  [address] at [facility]  [address] [place] [address], [place], [address] [place] [address] Street  [phone]   [phone]       OUTPATIENT PHYSICAL THERAPY:Daily Note 3/13/2019    ICD-10: Treatment Diagnosis: cervicalgia (M54.2)  Treatment diagnosis 2: Pain in thoracic spine (M54.6)  Precautions: None  Allergies: Patient has no known allergies. MD Orders: evaluate and treat  MEDICAL/REFERRING DIAGNOSIS:  Cervicalgia [M54.2]   DATE OF ONSET: Neck pain since 2018  REFERRING PHYSICIAN: Prakash KRUEGER PHYSICIAN APPOINTMENT: 2019     INITIAL ASSESSMENT:  Ms. Jodee Hong is a 28 y.o. female presenting to physical therapy with complaints of neck pain that started in 2018. Patient reports no injury prior to onset of pain. Pain is located in mid cervical spine and occasionally more to the right side. Pain is increased with cervical flexion and extension, and occasionally with rotation. Episodes of pain are brief and are relieved when moving back into neutral cervical position. Patient reports no distal symptoms into upper extremities but occasional tightness and irritation into thoracic spine. Pain is 8/10 at worst and 1/10 at best with only tightness. Patient reports no headaches. Patient has been seen for 4 sessions from 19 to 19. Patient has met goal for home exercise program and continues to make good progress towards goals for pain reduction, range of motion, and neck function. Patient reports continued pain in neck with work related duties but reports that stretches help to relieve pain. Patient continues to present with decrease range of motion in cervical spine and decreased overall function. Patient is a good candidate for skilled physical therapy services to include manual therapy, therapeutic exercise, and pain modalities as needed.      PROBLEM LIST (Impacting functional limitations):  1. Decreased Strength  2. Decreased ADL/Functional Activities  3. Increased Pain  4. Increased Fatigue  5. Decreased Flexibility/Joint Mobility INTERVENTIONS PLANNED:  1. Cold  2. Cryotherapy  3. Electrical Stimulation  4. Heat  5. Home Exercise Program (HEP)  6. Manual Therapy  7. Neuromuscular Re-education/Strengthening  8. Range of Motion (ROM)  9. Therapeutic Activites  10. Therapeutic Exercise/Strengthening  11. Transcutaneous Electrical Nerve Stimulation (TENS)  12. Ultrasound (US)  13. Therapeutic dry needling   TREATMENT PLAN:  Effective Dates: 2/14/19 to 3/16/19 (30 days). Frequency/Duration: RE-CERTIFICATION: Continue therapy at 1 time a week for 30 Days  GOALS: (Goals have been discussed and agreed upon with patient.)  Discharge Goals: Time Frame: 2/14/19 to 3/16/19  1. Patient will report no more than 3/10 pain with all active cervical motions and positions during ADL's. -ongoing  2. Patient will be independent with home program to improve posture and cervical positioning. -met  3. Patient will improve cervical extension to 40 degrees with 0/10 pain.-ongong  4. Patient will improve cervical flexion to 40 degrees with 0/10 pain.-ongoing  5. Patient will improve NDI score to 5/50 from 11/50. -ongoing  Rehabilitation Potential For Stated Goals: Good  Regarding [address] [facility] therapy, I certify that the treatment plan above will be carried out by a therapist or under their direction. Thank you for this referral,  Bessy Lino, PT                   The information in this section was collected on 2/14/19 (except where otherwise noted). HISTORY:   History of Present Injury/Illness (Reason for Referral):  Ms. Twila Garcia is a 28 y.o. female presenting to physical therapy with complaints of neck pain that started in November 2018. Patient reports no injury prior to onset of pain. Pain is located in mid cervical spine and occasionally more to the right side. Pain is increased with cervical flexion and extension, and occasionally with rotation. Episodes of pain are brief and are relieved when moving back into neutral cervical position. Patient reports no distal symptoms into upper extremities but occasional tightness and irritation into thoracic spine. Pain is 8/10 at worst and 1/10 at best with only tightness. Patient reports no headaches. Past Medical History/Comorbidities:   Ms. Ilan Crow  has no past medical history on file. Ms. Ilan Crow  has a past surgical history that includes hx lap cholecystectomy; hx wisdom teeth extraction; hx  section; and hx other surgical.  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 Dominant Side:         RIGHT     Ambulatory/Rehab Services H2 Model Falls Risk Assessment    Risk Factors:       No Risk Factors Identified Ability to Rise from Chair:       (0)  Ability to rise in a single movement    Falls Prevention Plan:       No modifications necessary   Total: (5 or greater = High Risk): 0    © [facility] [employer]. All Rights Reserved. [facility] Patent #[id number]. Federal Law prohibits the replication, distribution or use without written permission from [facility] [email or web address]       Current Medications:       Current Outpatient Medications:     methylphenidate ER 36 mg 24 hr tab, Take 1 tab daily when working, Disp: 30 Tab, Rfl: 0    methocarbamol (ROBAXIN) 500 mg tablet, Take 1 Tab by mouth four (4) times daily. , Disp: 90 Tab, Rfl: 1   Date Last Reviewed:  3/13/2019   Number of Personal Factors/Comorbidities that affect the Plan of Care:  (None) 0: LOW COMPLEXITY   EXAMINATION:   Observation/Orthostatic Postural Assessment:          Patient presents with mild forward head and rounded shoulders posture.   Palpation:          Tender at right cervical paraspinals and cervical spinous processes. ROM:            Cervical AROM: (degrees)  Rotation: 55 bilaterally  Side bending: left=30, right=25  Extension: 20, with pain  Flexion: 25, with pain    Strength:            Gross strength in bilateral upper extremities is 4+/5. Special Tests:            Spurling's=negative  Cervical compression=no pain or increase in symptoms  Cervical traction=stretching, relieving    Neurological Screen:        Myotomes:  Bilateral upper extremities normal        Dermatomes:  Intact for light touch and sensation    Functional Mobility:         Standing shoulder elevation: 175 degrees bilaterally     Body Structures Involved:  1. Bones  2. Joints  3. Muscles Body Functions Affected:  1. Sensory/Pain  2. Movement Related Activities and Participation Affected:  1. General Tasks and Demands  2. Mobility  3. Domestic Life  4. Community, Social and [place]   Number of elements (examined above) that affect the Plan of Care: 1-2: LOW COMPLEXITY   CLINICAL PRESENTATION:   Presentation: Stable and uncomplicated: LOW COMPLEXITY   CLINICAL DECISION MAKING:   Outcome Measure: Tool Used: Neck Disability Index (NDI)  Score:  Initial: 11/50  Most Recent: 9/50 (Date: 2/14/19 )   Interpretation of Score: The Neck Disability Index is a revised form of the Oswestry Low Back Pain Index and is designed to measure the activities of daily living in person's with neck pain. Each section is scored on a 0-5 scale, 5 representing the greatest disability. The scores of each section are added together for a total score of 50. Medical Necessity:   · Patient is expected to demonstrate progress in strength, range of motion, coordination and functional technique to decrease pain and increase tolerance for ADL's and cervical motions. · Skilled intervention continues to be required due to pain and limitation in cervical and thoracic spine.   Reason for Services/Other Comments:  · Patient continues to require skilled intervention due to pain and limitation in cervical and thoracic spine. Use of outcome tool(s) and clinical judgement create a POC that gives a:  (no co-morbidities, moderate pain, mild limitation on outcome measure) Clear prediction of patient's progress: LOW COMPLEXITY            TREATMENT:   (In addition to Assessment/Re-Assessment sessions the following treatments were rendered)  Pre-treatment Symptoms/Complaints:  Patient reports no pain today and good relief this past week and following last session of dry needling. Patient continues to report increased pain with repetitive cervical extension motions. Pain: Initial:   Pain Intensity 1: 0  Pain Location 1: Spine, cervical  Post Session:  Patient reports 0/10 pain     Therapeutic Exercise: (15 Minutes):  Exercises per grid below to improve mobility, strength and coordination. Required minimal visual, verbal and manual cues to promote proper body alignment, promote proper body posture and promote proper body mechanics. Progressed resistance, range, repetitions and complexity of movement as indicated.    Date:  3/13/19 Date:  2/28/19 Date:  3/5/19   Activity/Exercise Parameters Parameters Parameters   Chin tucks      Upper trap stretch 2x20 sec, B  2d64xyy, B   Rhomboid stretch [id number]  3b23ijl   Levator stretch   6a39hab, B   Doorway stretch   6n33ouo   Low rows 1x10, green  1x10, green   Bilateral external rotation 1x10, red  1x10, green   Head lifts  10x5 sec, lateral, B    Pull aparts 1x10, red         Manual Therapy (    Soft Tissue Mobilization Duration  Duration: 25 Minutes): Manual cervical traction, soft tissue mobilization to cervical paraspinals, and manual stretching into cervical side bending in supine, with therapeutic dry needling to paravertebrals C4,C5, C6  in prone    Therapeutic Modalities: for pain and edema               Cervical Spine Heat  Type: Moist pack  Duration: 15 minutes  Patient Position: Supine                             Cervical Spine Electrical Stimulation  Type: (H wave)  Placement: cervical paraspinals and upper trapezius  # of Electrodes: 4  Duration : 15 minutes  Patient Position: Supine                                                 HEP: As above; handouts given to patient for all exercises. Treatment/Session Assessment:    · Response to Treatment:  Patient continues to tolerate all treatments and exercises with no complaints. Patient continues to report irritation and pressure with dry needling but reports no issues following. Patient reports no issues or pain with exercises and stretches. · Compliance with Program/Exercises: compliant most of the time. · Recommendations/Intent for next treatment session: \"Next visit will focus on advancements to more challenging activities\". · Re-certification: Continue therapy at 1 time a week for 30 days from 2/14/19 to 3/16/19 to meet above goals.     Total Treatment Duration: 55 minutes    PT Patient Time In/Time Out  Time In: 1330  Time Out: Jose Ramon Ceron [address], PT

## 2019-03-19 ENCOUNTER — HOSPITAL ENCOUNTER (OUTPATIENT)
Dept: PHYSICAL THERAPY | Age: 34
Discharge: HOME | End: 2019-03-19
Payer: OTHER GOVERNMENT

## 2019-03-19 PROCEDURE — 97110 THERAPEUTIC EXERCISES: CPT

## 2019-03-19 PROCEDURE — 97140 MANUAL THERAPY 1/> REGIONS: CPT

## 2019-03-19 PROCEDURE — 97014 ELECTRIC STIMULATION THERAPY: CPT

## 2019-03-19 NOTE — THERAPY RECERTIFICATION
Makenna Sanches  : 1985  Primary: [facility]  Secondary:  [address] at [address], [place], [address] [place] [address]  [phone]   [phone]       OUTPATIENT PHYSICAL THERAPY:Daily Note, Progress Report and Recertification     ICD-10: Treatment Diagnosis: cervicalgia (M54.2)  Treatment diagnosis 2: Pain in thoracic spine (M54.6)  Precautions: None  Allergies: Patient has no known allergies. MD Orders: evaluate and treat  MEDICAL/REFERRING DIAGNOSIS:  Cervicalgia [M54.2]   DATE OF ONSET: Neck pain since 2018  REFERRING PHYSICIAN: Navi Watson PA-C  RETURN PHYSICIAN APPOINTMENT: Unknown     INITIAL ASSESSMENT:  Ms. Twila Garcia is a 28 y.o. female presenting to physical therapy with complaints of neck pain that started in 2018. Patient reports no injury prior to onset of pain. Pain is located in mid cervical spine and occasionally more to the right side. Pain is increased with cervical flexion and extension, and occasionally with rotation. Episodes of pain are brief and are relieved when moving back into neutral cervical position. Patient reports no distal symptoms into upper extremities but occasional tightness and irritation into thoracic spine. Pain is 8/10 at worst and 1/10 at best with only tightness. Patient reports no headaches. Patient has been seen for 8 sessions from 19 to 3/19/19. Patient has met goal for home exercise program and continues to make good progress towards goals for pain reduction, range of motion, and neck function. Patient reports good improvement over the past 2 weeks and reports continued improvement with dry needling. Patient would like to continue therapy using dry needling to further reduce pain and improve function.   Patient has increased range of motion in cervical spine but continues to present with increased pain in cervical spine and decreased overall function. Patient is a good candidate for skilled physical therapy services to include manual therapy, therapeutic exercise, and pain modalities as needed. PROBLEM LIST (Impacting functional limitations):  1. Decreased Strength  2. Decreased ADL/Functional Activities  3. Increased Pain  4. Increased Fatigue  5. Decreased Flexibility/Joint Mobility INTERVENTIONS PLANNED:  1. Cold  2. Cryotherapy  3. Electrical Stimulation  4. Heat  5. Home Exercise Program (HEP)  6. Manual Therapy  7. Neuromuscular Re-education/Strengthening  8. Range of Motion (ROM)  9. Therapeutic Activites  10. Therapeutic Exercise/Strengthening  11. Transcutaneous Electrical Nerve Stimulation (TENS)  12. Ultrasound (US)  13. Therapeutic dry needling   TREATMENT PLAN:  Effective Dates: 3/19/19 to 4/18/19 (30 days). Frequency/Duration: RE-CERTIFICATION: Continue therapy at 1 time a week for 30 Days  GOALS: (Goals have been discussed and agreed upon with patient.)  Discharge Goals: Time Frame: 3/19/19 to 4/18/19  1. Patient will report no more than 3/10 pain with all active cervical motions and positions during ADL's. -ongoing  2. Patient will be independent with home program to improve posture and cervical positioning. -met  3. Patient will improve cervical extension to 40 degrees with 0/10 pain.-ongong  4. Patient will improve cervical flexion to 40 degrees with 0/10 pain.-ongoing  5. Patient will improve NDI score to 5/50 from 11/50. -ongoing  Rehabilitation Potential For Stated Goals: Good  Regarding [address] therapy, I certify that the treatment plan above will be carried out by a therapist or under their direction. Thank you for this referral,  Wen Jacobo PT     Referring Physician Signature: Rich Baca PA-C              Date                        The information in this section was collected on 3/19/19 (except where otherwise noted).   HISTORY:   History of Present Injury/Illness (Reason for Referral):  Ms. Vasiliy Hook is a 28 y.o. female presenting to physical therapy with complaints of neck pain that started in 2018. Patient reports no injury prior to onset of pain. Pain is located in mid cervical spine and occasionally more to the right side. Pain is increased with cervical flexion and extension, and occasionally with rotation. Episodes of pain are brief and are relieved when moving back into neutral cervical position. Patient reports no distal symptoms into upper extremities but occasional tightness and irritation into thoracic spine. Pain is 8/10 at worst and 1/10 at best with only tightness. Patient reports no headaches. Past Medical History/Comorbidities:   Ms. Vasiliy Hook  has no past medical history on file. Ms. Vasiliy Hook  has a past surgical history that includes hx lap cholecystectomy; hx wisdom teeth extraction; hx  section; and hx other surgical.  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 Dominant Side:         RIGHT     Ambulatory/Rehab Services H2 Model Falls Risk Assessment    Risk Factors:       No Risk Factors Identified Ability to Rise from Chair:       (0)  Ability to rise in a single movement    Falls Prevention Plan:       No modifications necessary   Total: (5 or greater = High Risk): 0    © [facility] [employer]. All Rights Reserved. [facility] Patent #[id number]. Federal Law prohibits the replication, distribution or use without written permission from [facility]       Current Medications:       Current Outpatient Medications:     methylphenidate ER 36 mg 24 hr tab, Take 1 tab daily when working, Disp: 30 Tab, Rfl: 0    methocarbamol (ROBAXIN) 500 mg tablet, Take 1 Tab by mouth four (4) times daily. , Disp: 90 Tab, Rfl: 1   Date Last Reviewed:  3/19/2019   Number of Personal Factors/Comorbidities that affect the Plan of Care:  (None) 0: LOW COMPLEXITY   EXAMINATION:   Observation/Orthostatic Postural Assessment:          Patient presents with mild forward head and rounded shoulders posture. Palpation:          Tender at right cervical paraspinals and cervical spinous processes. ROM:            Cervical AROM: (degrees)  Rotation: 65 bilaterally  Side bending: left=30, right=25  Extension: 35, with pain  Flexion: 40, with pain    Strength:            Gross strength in bilateral upper extremities is 4+/5. Special Tests:            Spurling's=negative  Cervical compression=no pain or increase in symptoms  Cervical traction=stretching, relieving    Neurological Screen:        Myotomes:  Bilateral upper extremities normal        Dermatomes:  Intact for light touch and sensation    Functional Mobility:         Standing shoulder elevation: 175 degrees bilaterally     Body Structures Involved:  1. Bones  2. Joints  3. Muscles Body Functions Affected:  1. Sensory/Pain  2. Movement Related Activities and Participation Affected:  1. General Tasks and Demands  2. Mobility  3. Domestic Life  4. Community, Social and [place]   Number of elements (examined above) that affect the Plan of Care: 1-2: LOW COMPLEXITY   CLINICAL PRESENTATION:   Presentation: Stable and uncomplicated: LOW COMPLEXITY   CLINICAL DECISION MAKING:   Outcome Measure: Tool Used: Neck Disability Index (NDI)  Score:  Initial: 11/50  Most Recent:   7/50 (Date: 3/19/19)  9/50 (Date: 2/14/19 )   Interpretation of Score: The Neck Disability Index is a revised form of the Oswestry Low Back Pain Index and is designed to measure the activities of daily living in person's with neck pain. Each section is scored on a 0-5 scale, 5 representing the greatest disability. The scores of each section are added together for a total score of 50.        Medical Necessity:   · Patient is expected to demonstrate progress in strength, range of motion, coordination and functional technique to decrease pain and increase tolerance for ADL's and cervical motions. · Skilled intervention continues to be required due to pain and limitation in cervical and thoracic spine. Reason for Services/Other Comments:  · Patient continues to require skilled intervention due to pain and limitation in cervical and thoracic spine. Use of outcome tool(s) and clinical judgement create a POC that gives a:  (no co-morbidities, moderate pain, mild limitation on outcome measure) Clear prediction of patient's progress: LOW COMPLEXITY            TREATMENT:   (In addition to Assessment/Re-Assessment sessions the following treatments were rendered)  Pre-treatment Symptoms/Complaints:  Patient reports no pain today and minimal pain this week. Patient continues to report good relief with dry needling. Pain: Initial:   Pain Intensity 1: 0  Pain Location 1: Spine, cervical  Post Session:  Patient reports 0/10 pain     Therapeutic Exercise: (15 Minutes):  Exercises per grid below to improve mobility, strength and coordination. Required minimal visual, verbal and manual cues to promote proper body alignment, promote proper body posture and promote proper body mechanics. Progressed resistance, range, repetitions and complexity of movement as indicated.    Date:  3/13/19 Date:  3/19/19 Date:  3/5/19   Activity/Exercise Parameters Parameters Parameters   Chin tucks  1x10, supine    Upper trap stretch 2x20 sec, B [id number], B   Rhomboid stretch 2e66joo 9n10tkf 4l78igl   Levator stretch  0u13kxw 1z67ezn, B   Doorway stretch   0o19ule   Low rows 1x10, green  1x10, green   Bilateral external rotation 1x10, red  1x10, green   Head lifts      Pull aparts 1x10, red         Manual Therapy (    Soft Tissue Mobilization Duration  Duration: 15 Minutes): Manual cervical traction, soft tissue mobilization to cervical paraspinals, and manual stretching into cervical side bending in supine, with therapeutic dry needling to paravertebrals C3-C7  in prone    Therapeutic Modalities: for pain and edema               Cervical Spine Heat  Type: Moist pack  Duration: 15 minutes  Patient Position: Supine                             Cervical Spine Electrical Stimulation  Type: (H wave)  Placement: cervical paraspinals and upper trapezius  # of Electrodes: 5  Duration : 15 minutes  Patient Position: Supine                                                 HEP: As above; handouts given to patient for all exercises. Treatment/Session Assessment:    · Response to Treatment:  Patient continues to tolerate all treatments and exercises with no complaints. Patient continues to report irritation and pressure with dry needling but reports no issues following. Patient reports no issues or pain with exercises and stretches. Patient arrived 10 minutes after appointment time today. · Compliance with Program/Exercises: compliant most of the time. · Recommendations/Intent for next treatment session: \"Next visit will focus on advancements to more challenging activities\". · Re-certification: Continue therapy at 1 time a week for 30 days from 3/19/19 to 4/18/19 to meet above goals.     Total Treatment Duration: 45 minutes    PT Patient Time In/Time Out  Time In: 1010  Time Out: 1200 [facility]

## 2019-03-28 ENCOUNTER — HOSPITAL ENCOUNTER (OUTPATIENT)
Dept: PHYSICAL THERAPY | Age: 34
Discharge: HOME | End: 2019-03-28
Payer: OTHER GOVERNMENT

## 2019-03-28 PROCEDURE — 97014 ELECTRIC STIMULATION THERAPY: CPT

## 2019-03-28 PROCEDURE — 97140 MANUAL THERAPY 1/> REGIONS: CPT

## 2019-03-28 PROCEDURE — 97110 THERAPEUTIC EXERCISES: CPT

## 2019-03-28 NOTE — PROGRESS NOTES
Marisa Sanches  : 1985  Primary: [facility]  Secondary:  [address] at [address], [address]  [phone]   [phone]      OUTPATIENT PHYSICAL THERAPY: Daily Treatment Note 3/28/2019    Pre-treatment Symptoms/Complaints:  Patient reports mild pain today and mild pain over the past 2 weeks. Patient reports overall improvement. Pain: Initial: Pain Intensity 1: 3  Pain Location 1: Spine, cervical  Pain Orientation 1: Mid  Post Session:  2/10   Medications Last Reviewed:  3/28/2019  Precautions: None    Date of Onset: Neck pain since 2018    Updated Objective Findings:  None Today   TREATMENT:   THERAPEUTIC EXERCISE: (15 minutes):  Exercises per grid below to improve mobility, strength and coordination. Required minimal visual, verbal and manual cues to promote proper body alignment, promote proper body posture and promote proper body mechanics. Progressed resistance, range, repetitions and complexity of movement as indicated. Date:  3/28/2019 Date:   Date:     Activity/Exercise Parameters Parameters Parameters   Chin tucks 1x10, supine     Upper trap stretch [id number]     Levator stretch [id number]     Rhomboid stretch 0a79tyx     Doorway stretch 5l38cdz     Pull aparts 1x10, red     Bilateral external rotation 1x10, red       MANUAL THERAPY: (25 minutes): Soft tissue mobilization was utilized and necessary because of the patient's painful spasm and restricted motion of soft tissue. Soft tissue mobilization to cervical paraspinals with manual cervical traction and suboccipital release in supine, manual stretching into cervical side bending       MODALITIES: (10 minutes): *  Electrical Stimulation Therapy (H wave, 4 electrodes, cervical paraspinals and upper trapezius) was provided with intensity adjusted throughout treatment to patient tolerance.  supine       *  Hot Pack Therapy in order to relieve muscle spasm.     HEP: As above; handouts given to patient for all exercises. Treatment/Session Summary:    · Response to Treatment:  Patient continues to tolerate all treatments and exercises with no complaintsl and reports relief with manual therapy. · Communication/Consultation:  None today  · Equipment provided today:  None today  · Recommendations/Intent for next treatment session: Next visit will focus on progression of postural mobility and stabilization, with manual therapy and modalities as needed.   Treatment Plan of Care Effective Dates: 3/19/19 to 4/19/19    Total Treatment Billable Duration:  50 minutes    PT Patient Time In/Time Out  Time In: 1240  Time Out: 1330  Evelina Timmons PT

## 2019-04-04 ENCOUNTER — HOSPITAL ENCOUNTER (OUTPATIENT)
Dept: PHYSICAL THERAPY | Age: 34
Discharge: HOME | End: 2019-04-04
Payer: OTHER GOVERNMENT

## 2019-04-10 ENCOUNTER — HOSPITAL ENCOUNTER (OUTPATIENT)
Dept: PHYSICAL THERAPY | Age: 34
Discharge: HOME | End: 2019-04-10
Payer: OTHER GOVERNMENT

## 2019-04-10 PROCEDURE — 97110 THERAPEUTIC EXERCISES: CPT

## 2019-04-10 PROCEDURE — 97140 MANUAL THERAPY 1/> REGIONS: CPT

## 2019-04-10 NOTE — PROGRESS NOTES
Ann Marie Brain Krunalnespring  : 1985  Primary: [facility]  Secondary:  [address] at [address], [address]  [phone]   [phone]      OUTPATIENT PHYSICAL THERAPY: Daily Treatment Note 4/10/2019    Pre-treatment Symptoms/Complaints:  Patient reports mild pain today but increased pain a few days ago when she slept wrong. Patient had a cervical adjustment by MD last week at visit. Patient arrived late for appointment. Pain: Initial: Pain Intensity 1: 3  Pain Location 1: Spine, cervical  Pain Orientation 1: Left, Right  Post Session:  1/10   Medications Last Reviewed:  4/10/2019  Precautions: None    Date of Onset: Neck pain since 2018    Updated Objective Findings:  None Today   TREATMENT:   THERAPEUTIC EXERCISE: (30 minutes):  Exercises per grid below to improve mobility, strength and coordination. Required minimal visual, verbal and manual cues to promote proper body alignment, promote proper body posture and promote proper body mechanics. Progressed resistance, range, repetitions and complexity of movement as indicated. Date:  3/28/2019 Date:  4/10/19 Date:     Activity/Exercise Parameters Parameters Parameters   Chin tucks 1x10, supine 1x10    Upper trap stretch [id number] 1z35gzf    Levator stretch 1z77ljv 4p22vtx    Rhomboid stretch 4y01hqx 7i41ikh    Prayer stretch  7j23afh    Open book stretch  9z03aao    Low rows  2x10, green    Rows  2x10, black    Doorway stretch 6a89znk     Pull aparts 1x10, red 2x10, red    Bilateral external rotation 1x10, red 2x10, red      MANUAL THERAPY: (15 minutes): Soft tissue mobilization was utilized and necessary because of the patient's painful spasm and restricted motion of soft tissue.   Soft tissue mobilization to cervical paraspinals with manual cervical traction and suboccipital release in supine, manual stretching into cervical side bending   Grade V thoracic mobilization in supine    MODALITIES: (0 minutes):      None     HEP: As above; handouts given to patient for all exercises. Treatment/Session Summary:    · Response to Treatment:  patient continues to tolerate exercises and reports decreased tightness/improved mobility with thoracic mobilization. · Communication/Consultation:  None today  · Equipment provided today:  None today  · Recommendations/Intent for next treatment session: Next visit will focus on progression of postural mobility and stabilization, with manual therapy and modalities as needed.   Treatment Plan of Care Effective Dates: 3/19/19 to 4/19/19    Total Treatment Billable Duration:  45 minutes    PT Patient Time In/Time Out  Time In: 1240  Time Out: [id number] [address] [place], PT

## 2019-04-18 ENCOUNTER — HOSPITAL ENCOUNTER (OUTPATIENT)
Dept: PHYSICAL THERAPY | Age: 34
Discharge: HOME | End: 2019-04-18
Payer: OTHER GOVERNMENT

## 2019-04-18 PROCEDURE — 97140 MANUAL THERAPY 1/> REGIONS: CPT

## 2019-04-18 PROCEDURE — 97110 THERAPEUTIC EXERCISES: CPT

## 2019-04-18 PROCEDURE — 97014 ELECTRIC STIMULATION THERAPY: CPT

## 2019-04-18 NOTE — THERAPY RECERTIFICATION
Richard Sanches  : 1985  Primary: [facility]  Secondary:  [address] at [address], [place], [address] [place] [address]  [phone]   [phone]       OUTPATIENT PHYSICAL THERAPY:Recertification [id number]    ICD-10: Treatment Diagnosis: cervicalgia (M54.2)  Treatment diagnosis 2: Pain in thoracic spine (M54.6)  Precautions: None  Allergies: Patient has no known allergies. MD Orders: evaluate and treat  MEDICAL/REFERRING DIAGNOSIS:  Cervicalgia [M54.2]   DATE OF ONSET: Neck pain since 2018  REFERRING PHYSICIAN: Anny Davis PA-C  RETURN PHYSICIAN APPOINTMENT: Unknown     INITIAL ASSESSMENT:  Ms. Niecy Ashley is a 28 y.o. female presenting to physical therapy with complaints of neck pain that started in 2018. Patient reports no injury prior to onset of pain. Pain is located in mid cervical spine and occasionally more to the right side. Pain is increased with cervical flexion and extension, and occasionally with rotation. Episodes of pain are brief and are relieved when moving back into neutral cervical position. Patient reports no distal symptoms into upper extremities but occasional tightness and irritation into thoracic spine. Pain is 8/10 at worst and 1/10 at best with only tightness. Patient reports no headaches. Patient has been seen for 11 sessions from 19 to 19. Patient has partially met goals for range of motion and continues to make progress towards goals for pain reduction and neck function. Patient reports continued episodes of pain with ADL's and work duties but reports that pain is decreased/controlled with home exercises. Patient reports being very stressed over the past month due to family member medical issues.   Patient continues to report pain in cervical spine and thoracic spine with ADL's, and continues to present with pain and limitation with left cervical rotation and cervical extension. Patient is a good candidate for skilled physical therapy services to include manual therapy, therapeutic exercise, and pain modalities as needed. PROBLEM LIST (Impacting functional limitations):  1. Decreased Strength  2. Decreased ADL/Functional Activities  3. Increased Pain  4. Increased Fatigue  5. Decreased Flexibility/Joint Mobility INTERVENTIONS PLANNED:  1. Cold  2. Cryotherapy  3. Electrical Stimulation  4. Heat  5. Home Exercise Program (HEP)  6. Manual Therapy  7. Neuromuscular Re-education/Strengthening  8. Range of Motion (ROM)  9. Therapeutic Activites  10. Therapeutic Exercise/Strengthening  11. Transcutaneous Electrical Nerve Stimulation (TENS)  12. Ultrasound (US)  13. Therapeutic dry needling   TREATMENT PLAN:  Effective Dates: 4/18/19 to 6/2/19(45 days). Frequency/Duration: RE-CERTIFICATION: Continue therapy at 1 time every two weeks for 6 weeks. GOALS: (Goals have been discussed and agreed upon with patient.)  Discharge Goals: Time Frame: 4/18/19 to 6/2/19  1. Patient will report no more than 3/10 pain with all active cervical motions and positions during ADL's. -ongoing  2. Patient will be independent with home program to improve posture and cervical positioning. -met  3. Patient will improve cervical extension to 40 degrees with 0/10 pain.-ongong  4. Patient will improve cervical flexion to 40 degrees with 0/10 pain.-met  5. Patient will improve NDI score to 5/50 from 11/50. -ongoing      Outcome Measure: Tool Used: Neck Disability Index (NDI)  Score:  Initial: 11/50  Most Recent:   10/50 (Date: 4/18/19) Due mostly to sleep disturbance  7/50 (Date: 3/19/19)  9/50 (Date: 2/14/19 )   Interpretation of Score: The Neck Disability Index is a revised form of the Oswestry Low Back Pain Index and is designed to measure the activities of daily living in person's with neck pain. Each section is scored on a 0-5 scale, 5 representing the greatest disability.   The scores of each section are added together for a total score of 50. ROM:            Cervical AROM: (degrees)  Rotation: left=55, right=60  Side bending: left=30, right=25  Extension: 25, with pain  Flexion: 40    Medical Necessity:   · Patient is expected to demonstrate progress in strength, range of motion, coordination and functional technique to decrease pain and increase tolerance for ADL's and cervical motions. · Skilled intervention continues to be required due to pain and limitation in cervical and thoracic spine. Reason for Services/Other Comments:  · Patient continues to require skilled intervention due to pain and limitation in cervical and thoracic spine. Rehabilitation Potential For Stated Goals: Good  Regarding [address] therapy, I certify that the treatment plan above will be carried out by a therapist or under their direction.   Thank you for this referral,  Joe Jeffers PT     Referring Physician Signature: Fransisco Perez

## 2019-04-18 NOTE — PROGRESS NOTES
Evelia Sanches  : 1985  Primary: [facility]  Secondary:  [address] at [address], [address] [place] [address]  [phone]   [phone]      OUTPATIENT PHYSICAL THERAPY: Daily Treatment Note 2019    Pre-treatment Symptoms/Complaints:  Patient reports no pain today but tightness in right upper trapezius. Pain: Initial: Pain Intensity 1: 1  Pain Location 1: (Upper trapezius)  Pain Orientation 1: Right  Post Session:  2/10, right cervical   Medications Last Reviewed:  2019  Precautions: None    Date of Onset: Neck pain since 2018    Updated Objective Findings:  See re-certification   TREATMENT:   THERAPEUTIC EXERCISE: (25 minutes):  Exercises per grid below to improve mobility, strength and coordination. Required minimal visual, verbal and manual cues to promote proper body alignment, promote proper body posture and promote proper body mechanics. Progressed resistance, range, repetitions and complexity of movement as indicated. Date:  3/28/2019 Date:  4/10/19 Date:  19   Activity/Exercise Parameters Parameters Parameters   Chin tucks 1x10, supine 1x10 1x10   Upper trap stretch 2m60mre 5h24ftf 8u50hfr   Levator stretch 0v44yav 7g73yai 3m64kzp   Rhomboid stretch 4y52hde 3w88vgp 0j89gyl   Head lifts   5x5sec, sidelying   Prayer stretch  6c81wpv    Open book stretch  0h80jgw    Low rows  2x10, green    Rows  2x10, black 2x10, black   Doorway stretch 9i96faz  3t39oaj   Pull aparts 1x10, red 2x10, red 2x10, red   Bilateral external rotation 1x10, red 2x10, red      MANUAL THERAPY: (15 minutes): Soft tissue mobilization was utilized and necessary because of the patient's painful spasm and restricted motion of soft tissue.   Soft tissue mobilization to cervical paraspinals with manual cervical traction and suboccipital release in supine, manual stretching into cervical side bending   Grade V thoracic mobilization in supine    MODALITIES: (15 minutes): *  Electrical Stimulation Therapy (H wave, cervical paraspinals and upper trapezius) was provided with intensity adjusted throughout treatment to patient tolerance. Supine       *  Hot Pack Therapy in order to provide analgesia and relieve muscle spasm. HEP: As above; handouts given to patient for all exercises. Treatment/Session Summary:    · Response to Treatment:  Patient continues to tolerate all exercises with no complaints and reports good relief with thoracic mobilization. Patient reports slight pain in right cervical spine post session. .  · Communication/Consultation:  None today  · Equipment provided today:  None today  · Recommendations/Intent for next treatment session: Next visit will focus on progression of postural mobility and stabilization, with manual therapy and modalities as needed.   Treatment Plan of Care Effective Dates: 4/18/19 to 6/2/19    Total Treatment Billable Duration:  55 minutes    PT Patient Time In/Time Out  Time In: 1240  Time Out: 225 [facility],

## 2019-05-01 ENCOUNTER — HOSPITAL ENCOUNTER (OUTPATIENT)
Dept: PHYSICAL THERAPY | Age: 34
Discharge: HOME | End: 2019-05-01
Payer: OTHER GOVERNMENT

## 2019-05-01 PROCEDURE — 97014 ELECTRIC STIMULATION THERAPY: CPT

## 2019-05-01 PROCEDURE — 97140 MANUAL THERAPY 1/> REGIONS: CPT

## 2019-05-01 PROCEDURE — 97110 THERAPEUTIC EXERCISES: CPT

## 2019-05-01 NOTE — PROGRESS NOTES
Michelle Sanches  : 1985  Primary: [facility]  Secondary:  [address] at [address], [address]  [phone]   [phone]      OUTPATIENT PHYSICAL THERAPY: Daily Treatment Note 2019    Pre-treatment Symptoms/Complaints:  Patient reports no pain today and minimal issues over the past 2 weeks. Pain: Initial: Pain Intensity 1: 0  Pain Location 1: Spine, cervical  Post Session:  0/10   Medications Last Reviewed:  2019  Precautions: None    Date of Onset: Neck pain since 2018    Updated Objective Findings:  None Today   TREATMENT:   THERAPEUTIC EXERCISE: (15 minutes):  Exercises per grid below to improve mobility, strength and coordination. Required minimal visual, verbal and manual cues to promote proper body alignment, promote proper body posture and promote proper body mechanics. Progressed resistance, range, repetitions and complexity of movement as indicated. Date:  19 Date:  4/10/19 Date:  19   Activity/Exercise Parameters Parameters Parameters   Chin tucks  1x10 1x10   Upper trap stretch 3k89pmf 5r95yxs 7h31qfi   Levator stretch 0f33rhe 9s09rcc 6o31cdz   Rhomboid stretch  6l42pzq 9v95sju   Head lifts   5x5sec, sidelying   Prayer stretch  4n28vym    Open book stretch  5y83hmf    Low rows  2x10, green    Rows 2x10, black 2x10, black 2x10, black   Doorway stretch 3f82uyj  1r17oao   Pull aparts 2x10, red 2x10, red 2x10, red   Bilateral external rotation  2x10, red      MANUAL THERAPY: (25 minutes): Soft tissue mobilization was utilized and necessary because of the patient's painful spasm and restricted motion of soft tissue. Soft tissue mobilization to cervical paraspinals with manual cervical traction and suboccipital release in supine, manual stretching into cervical side bending   Grade V thoracic mobilization in supine    MODALITIES: (15 minutes):       *  Electrical Stimulation Therapy (H wave, cervical paraspinals and upper trapezius) was provided with intensity adjusted throughout treatment to patient tolerance. Supine       *  Hot Pack Therapy in order to provide analgesia and relieve muscle spasm. HEP: As above; handouts given to patient for all exercises. Treatment/Session Summary:    · Response to Treatment:  Patient continues to tolerate all treaments and exercises with no complaints. .  · Communication/Consultation:  None today  · Equipment provided today:  None today  · Recommendations/Intent for next treatment session: Next visit will focus on progression of postural mobility and stabilization, with manual therapy and modalities as needed.   Treatment Plan of Care Effective Dates: 4/18/19 to 6/2/19    Total Treatment Billable Duration:  55 minutes    PT Patient Time In/Time Out  Time In: 1235  Time Out: 1 Annemarie Santos, PT

## 2019-05-15 ENCOUNTER — HOSPITAL ENCOUNTER (OUTPATIENT)
Dept: PHYSICAL THERAPY | Age: 34
Discharge: HOME | End: 2019-05-15
Payer: OTHER GOVERNMENT

## 2019-05-15 PROCEDURE — 97014 ELECTRIC STIMULATION THERAPY: CPT

## 2019-05-15 PROCEDURE — 97110 THERAPEUTIC EXERCISES: CPT

## 2019-05-15 PROCEDURE — 97140 MANUAL THERAPY 1/> REGIONS: CPT

## 2019-05-15 NOTE — PROGRESS NOTES
Johana Sanches  : 1985  Primary: [facility]  Secondary:  [address] at [address], [address]  [phone]   [phone]      OUTPATIENT PHYSICAL THERAPY: Daily Treatment Note 5/15/2019    Pre-treatment Symptoms/Complaints:  Patient reports no pain today but minor irritation from \"sleeping wrong. \"      Pain: Initial: Pain Intensity 1: 1  Pain Location 1: Spine, cervical  Pain Orientation 1: Left  Post Session:  0/10   Medications Last Reviewed:  5/15/2019  Precautions: None    Date of Onset: Neck pain since 2018    Updated Objective Findings:  See discharge   TREATMENT:   THERAPEUTIC EXERCISE: (25 minutes):  Exercises per grid below to improve mobility, strength and coordination. Required minimal visual, verbal and manual cues to promote proper body alignment, promote proper body posture and promote proper body mechanics. Progressed resistance, range, repetitions and complexity of movement as indicated. Date:  19 Date:  5/15/19 Date:  19   Activity/Exercise Parameters Parameters Parameters   Chin tucks  1x10 1x10   Upper trap stretch 6k89dgr 0k56zeb 4t61pob   Levator stretch 1j41uoj 2b43ocp 6l81jmc   Rhomboid stretch  1u28yvx 2j61jjb   Head lifts   5x5sec, sidelying   Prayer stretch      Open book stretch      Low rows      Rows 2x10, black  2x10, black   Doorway stretch 7a04pwz 7d12agf 0e65zfg   Pull aparts 2x10, red  2x10, red   Bilateral external rotation        MANUAL THERAPY: (15 minutes): Soft tissue mobilization was utilized and necessary because of the patient's painful spasm and restricted motion of soft tissue. Soft tissue mobilization to cervical paraspinals with manual cervical traction to stretch soft tissues and suboccipital release in supine, manual stretching into cervical side bending    MODALITIES: (15 minutes):       *  Electrical Stimulation Therapy (H wave, cervical paraspinals and upper trapezius) was provided with intensity adjusted throughout treatment to patient tolerance. Supine       *  Hot Pack Therapy in order to provide analgesia and relieve muscle spasm. HEP: As above; handouts given to patient for all exercises. Treatment/Session Summary:    · Response to Treatment:  Patient continues to tolerate all treaments and exercises with no complaints and reports no issues with home program.  · Communication/Consultation:  None today  · Equipment provided today:  None today  · Recommendations/Intent for next treatment session:  Patient to be discharged.     Treatment Plan of Care Effective Dates: 4/18/19 to 6/2/19    Total Treatment Billable Duration:  55 minutes    PT Patient Time In/Time Out  Time In: 1235  Time Out: 1 Annemarie Santos, PT

## 2019-05-15 NOTE — THERAPY DISCHARGE
Maksim Sanches  : 1985  Primary: [facility]  Secondary:  [address] at [facility], Pratik gracia, [address]  [phone]   [phone]       OUTPATIENT PHYSICAL [facility] 5/15/2019    ICD-10: Treatment Diagnosis: cervicalgia (M54.2)  Treatment diagnosis 2: Pain in thoracic spine (M54.6)  Precautions: None  Allergies: Patient has no known allergies. MD Orders: evaluate and treat  MEDICAL/REFERRING DIAGNOSIS:  Cervicalgia [M54.2]   DATE OF ONSET: Neck pain since 2018  REFERRING PHYSICIAN: Avi Crigler RETURN PHYSICIAN APPOINTMENT: 2019     INITIAL ASSESSMENT:  Ms. Pina Sims is a 28 y.o. female presenting to physical therapy with complaints of neck pain that started in 2018. Patient reports no injury prior to onset of pain. Pain is located in mid cervical spine and occasionally more to the right side. Pain is increased with cervical flexion and extension, and occasionally with rotation. Episodes of pain are brief and are relieved when moving back into neutral cervical position. Patient reports no distal symptoms into upper extremities but occasional tightness and irritation into thoracic spine. Pain is 8/10 at worst and 1/10 at best with only tightness. Patient reports no headaches. Patient has been seen for 13 sessions from 19 to 5/15/19. Patient has met goals for pain reduction, posture, cervical flexion motion, and overall neck function. Patient continues to report mild to moderate pain with cervical extension at end range and presents with decrease range of motion in cervical extension. Patient reports minimal pain and no limitations with ADL's, however. Patient reports that she is ready to discontinue therapy and will continue with home program.  Patient to be discharged at this time. PROBLEM LIST (Impacting functional limitations):  1.  Decreased Strength  2. Decreased ADL/Functional Activities  3. Increased Pain  4. Increased Fatigue  5. Decreased Flexibility/Joint Mobility INTERVENTIONS PLANNED:  1. Cold  2. Cryotherapy  3. Electrical Stimulation  4. Heat  5. Home Exercise Program (HEP)  6. Manual Therapy  7. Neuromuscular Re-education/Strengthening  8. Range of Motion (ROM)  9. Therapeutic Activites  10. Therapeutic Exercise/Strengthening  11. Transcutaneous Electrical Nerve Stimulation (TENS)  12. Ultrasound (US)  13. Therapeutic dry needling   TREATMENT PLAN:  Effective Dates: 4/18/19 to 6/2/19(45 days). Frequency/Duration: Patient to be discharged. GOALS: (Goals have been discussed and agreed upon with patient.)  Discharge Goals: Time Frame: 4/18/19 to 6/2/19  1. Patient will report no more than 3/10 pain with all active cervical motions and positions during ADL's. -met  2. Patient will be independent with home program to improve posture and cervical positioning. -met  3. Patient will improve cervical extension to 40 degrees with 0/10 pain. -not met  4. Patient will improve cervical flexion to 40 degrees with 0/10 pain.-met  5. Patient will improve NDI score to 5/50 from 11/50. -met      Outcome Measure: Tool Used: Neck Disability Index (NDI)  Score:  Initial: 11/50  Most Recent:   2/50 (Date: 5/15/19)  10/50 (Date: 4/18/19) Due mostly to sleep disturbance  7/50 (Date: 3/19/19)  9/50 (Date: 2/14/19 )   Interpretation of Score: The Neck Disability Index is a revised form of the Oswestry Low Back Pain Index and is designed to measure the activities of daily living in person's with neck pain. Each section is scored on a 0-5 scale, 5 representing the greatest disability. The scores of each section are added together for a total score of 50.         ROM:            Cervical AROM: (degrees)  Rotation: left=70, right=75  Side bending: left=40, right=45  Extension: 25, with pain  Flexion: 40      Rehabilitation Potential For Stated Goals: Good  Regarding Evelia Sanches's therapy, I certify that the treatment plan above will be carried out by a therapist or under their direction.   Thank you for this referral,  Andres Romero, PT

## 2019-05-29 ENCOUNTER — HOSPITAL ENCOUNTER (OUTPATIENT)
Dept: MAMMOGRAPHY | Age: 34
Discharge: HOME | End: 2019-05-29
Attending: FAMILY MEDICINE
Payer: OTHER GOVERNMENT

## 2019-05-29 ENCOUNTER — APPOINTMENT (OUTPATIENT)
Dept: PHYSICAL THERAPY | Age: 34
End: 2019-05-29
Payer: OTHER GOVERNMENT

## 2019-05-29 DIAGNOSIS — N63.10 LUMP OF BREAST, RIGHT: ICD-10-CM

## 2019-05-29 PROCEDURE — 76642 ULTRASOUND BREAST LIMITED: CPT

## 2019-05-29 PROCEDURE — 77066 DX MAMMO INCL CAD BI: CPT

## 2019-05-30 NOTE — PROGRESS NOTES
Pt's mammogram came back with a fibroglandular tissue and MRI was recommended. Did she get that report already? I will order the MRI if she is agreeable to get it.

## 2019-09-05 ENCOUNTER — HOSPITAL ENCOUNTER (OUTPATIENT)
Dept: MRI IMAGING | Age: 34
Discharge: HOME | End: 2019-09-05
Attending: FAMILY MEDICINE
Payer: OTHER GOVERNMENT

## 2019-09-05 DIAGNOSIS — R93.89 ABNORMAL ULTRASOUND: ICD-10-CM

## 2019-09-05 PROCEDURE — 74011250636 HC RX REV CODE- 250/636: Performed by: FAMILY MEDICINE

## 2019-09-05 PROCEDURE — 74011000258 HC RX REV CODE- 258: Performed by: FAMILY MEDICINE

## 2019-09-05 PROCEDURE — 77049 MRI BREAST C-+ W/CAD BI: CPT

## 2019-09-05 PROCEDURE — C8908 MRI W/O FOL W/CONT, BREAST,: HCPCS

## 2019-09-05 PROCEDURE — A9575 INJ GADOTERATE MEGLUMI 0.1ML: HCPCS | Performed by: FAMILY MEDICINE

## 2019-09-05 RX ORDER — GADOTERATE MEGLUMINE 376.9 MG/ML
13 INJECTION INTRAVENOUS
Status: COMPLETED | OUTPATIENT
Start: 2019-09-05 | End: 2019-09-05

## 2019-09-05 RX ORDER — SODIUM CHLORIDE 0.9 % (FLUSH) 0.9 %
10 SYRINGE (ML) INJECTION
Status: COMPLETED | OUTPATIENT
Start: 2019-09-05 | End: 2019-09-05

## 2019-09-05 RX ADMIN — SODIUM CHLORIDE 100 ML: 900 INJECTION, SOLUTION INTRAVENOUS at 14:35

## 2019-09-05 RX ADMIN — GADOTERATE MEGLUMINE 13 ML: 376.9 INJECTION INTRAVENOUS at 14:36

## 2019-09-05 RX ADMIN — Medication 10 ML: at 14:35

## 2020-06-23 ENCOUNTER — HOSPITAL ENCOUNTER (OUTPATIENT)
Dept: PHYSICAL THERAPY | Age: 35
Discharge: HOME | End: 2020-06-23
Payer: OTHER GOVERNMENT

## 2020-06-23 DIAGNOSIS — M54.2 NECK PAIN: ICD-10-CM

## 2020-06-23 PROCEDURE — 97140 MANUAL THERAPY 1/> REGIONS: CPT

## 2020-06-23 PROCEDURE — 97110 THERAPEUTIC EXERCISES: CPT

## 2020-06-23 PROCEDURE — 97161 PT EVAL LOW COMPLEX 20 MIN: CPT

## 2020-06-24 NOTE — PROGRESS NOTES
Renae Sanches  : 1985  Primary: [facility]  Secondary:  [address] at [facility] Therapy  [address], [address]  [phone]   [phone]       OUTPATIENT PHYSICAL THERAPY:Daily Note 2020      TREATMENT:   Time In: 1600     Time Out: 1700     Total Time: 60min    Subjective: See evaluation for 2020    Objective: See evaluation for 2020      Therapeutic Exercise: (18min) Done in order to improve strength, ROM and understanding of current condition.     Date:   Date:   Date:   Date:     Activity/Exercise Parameters      Education Discussed examination findings, HEP, plan of care      Chin Tuck 5x10, supine and sitting      Self SNAGs 2x15, R/L/forward                               Manual Therapy: (18min) Done in order to improve joint and soft tissue mobility,reduce muscle guarding, and decrease muscle tone   Date:   Date:   Date:   Date:     Type Parameters      Joint Mobilization · Grade V: CTJ seated, prone TS  · Cervical: distraction grades I-III, UPA/CPA grades I-III C6-7  · C2-3: grades I-II CPA      Soft Tissue Mobilization           Modalities: (-) Done in order to reduce swelling and pain    Assessment: See evaluation for 2020    Plan: See evaluation for 2020    Future Appointments   Date Time Provider Dave Henry   2020  3:00 PM Gilson Haste, PT [facility]   2020  1:00 PM [place] Haste, PT [facility]   2020  4:00 PM Gilson Haste, PT [facility]   2020  4:00 PM [place] Haste, PT [facility]   2020  4:00 PM Gilson Haste, PT [facility]       Edmund Nieves Time:  Richi Dave Or PT, DPT

## 2020-06-26 ENCOUNTER — HOSPITAL ENCOUNTER (OUTPATIENT)
Dept: PHYSICAL THERAPY | Age: 35
Discharge: HOME | End: 2020-06-26
Payer: OTHER GOVERNMENT

## 2020-06-26 PROCEDURE — 97140 MANUAL THERAPY 1/> REGIONS: CPT

## 2020-06-26 PROCEDURE — 97110 THERAPEUTIC EXERCISES: CPT

## 2020-06-26 NOTE — PROGRESS NOTES
Renae Sanches  : 1985  Primary: [facility]  Secondary:  [address] at [place] [address] Therapy  [address], [facility] [address]  [phone]   [phone]       OUTPATIENT PHYSICAL THERAPY:Daily Note 2020      TREATMENT:   Time In: 1509     Time Out: 1556     Total Time: 47min    Subjective: Pt states she is feeling much better since the eval, hasn't had any headaches and her pain is minimal    Objective: Therapeutic Exercise: (31min) Done in order to improve strength, ROM and understanding of current condition. Date:   Date:   Date:   Date:     Activity/Exercise Parameters      Education Discussed examination findings, HEP, plan of care      Chin Tuck 5x10, supine and sitting DNF holds - 3min     Self SNAGs 2x15, R/L/forward      Rows  3x15, 40lbs     Cervical Rotation AAROM  6l0w7xqo, R/L; supine on ball     Cervical Retraction  3x10, yellow TB; supine     Cat-Cow  x3min                                     Manual Therapy: (16min) Done in order to improve joint and soft tissue mobility,reduce muscle guarding, and decrease muscle tone   Date:   Date:   Date:   Date:     Type Parameters      Joint Mobilization · Grade V: CTJ seated, prone TS  · Cervical: distraction grades I-III, UPA/CPA grades I-III C6-7  · C2-3: grades I-II CPA · C5-7: grades II-IV, CPA/UPA; prone and supine  · C2-3: grades I-III, CPA     Soft Tissue Mobilization           Modalities: (-) Done in order to reduce swelling and pain    Assessment: Pt had improvements in cervical rotation ROM stating the session (R/L: 61deg/70deg) and improved to R/L: 68deg/73deg and had less pain.      Plan: continue per POC    Future Appointments   Date Time Provider Dave Henry   2020  1:00 PM Gilson Dee PT [facility]   2020  4:00 PM JAM Curry [facility]   2020  4:00 PM Gilson Dee PT [facility] 7/9/2020  4:00 PM Sunday Lozano PT [facility]       Shirley Muller Time:        Fausto Pal PT, DPT

## 2020-06-29 ENCOUNTER — HOSPITAL ENCOUNTER (OUTPATIENT)
Dept: PHYSICAL THERAPY | Age: 35
Discharge: HOME | End: 2020-06-29
Payer: OTHER GOVERNMENT

## 2020-06-29 PROCEDURE — 97140 MANUAL THERAPY 1/> REGIONS: CPT

## 2020-06-29 PROCEDURE — 97110 THERAPEUTIC EXERCISES: CPT

## 2020-06-29 NOTE — PROGRESS NOTES
[facility]one[facility]  : 1985  Primary: [facility]  Secondary:  Therapy Center at [facility] Therapy  [address], [facility], [address]  [phone]   [phone]       OUTPATIENT PHYSICAL THERAPY:Daily Note 2020      TREATMENT:   Time In: 1315     Time Out: 1400     Total Time: 45min    Subjective: Pt states she had two headaches over the weekend that were not as severe as normal and had an instance of pain down her back by her shoulder blade    Objective: Therapeutic Exercise: (32min) Done in order to improve strength, ROM and understanding of current condition. Date:   Date:   Date:   Date:     Activity/Exercise Parameters      Education Discussed examination findings, HEP, plan of care      Chin Tuck 5x10, supine and sitting DNF holds - 3min DNF holds - 2x3min    Self SNAGs 2x15, R/L/forward      Rows  3x15, 40lbs 3x10, 30lbs eyes focused on dot    Cervical Rotation AAROM  3y9i9run, R/L; supine on ball     Cervical Retraction  3x10, yellow TB; supine 6x10, yellow TB; supine/sitting    Cat-Cow  x3min x3min    Shoulder W   3x10, yellow TB supine eyes on dot    Bridges   3x10, eyes focused on dot                      Manual Therapy: (13min) Done in order to improve joint and soft tissue mobility,reduce muscle guarding, and decrease muscle tone   Date:   Date:   Date:   Date:     Type Parameters      Joint Mobilization · Grade V: CTJ seated, prone TS  · Cervical: distraction grades I-III, UPA/CPA grades I-III C6-7  · C2-3: grades I-II CPA · C5-7: grades II-IV, CPA/UPA; prone and supine  · C2-3: grades I-III, CPA · C5-7: grades III-IV, CPA/UPA  · C2-3: grades I-III, CPA    Soft Tissue Mobilization   Suboccipitals        Modalities: (-) Done in order to reduce swelling and pain    Assessment: Pt continues to have fatigue in neck muscles by end of session and has difficulty maintaining form with deep neck flexor exercise. No pain during session and improved following MT     Plan: continue per POC    Future Appointments   Date Time Provider Dave Henry   6/30/2020  4:00 PM Jd Montilla   7/6/2020  4:00 PM [facility]   7/9/2020  4:00 PM [facility] Time:        Lizzy Hodgson PT, DPT

## 2020-06-30 ENCOUNTER — HOSPITAL ENCOUNTER (OUTPATIENT)
Dept: PHYSICAL THERAPY | Age: 35
Discharge: HOME | End: 2020-06-30
Payer: OTHER GOVERNMENT

## 2020-06-30 PROCEDURE — 97140 MANUAL THERAPY 1/> REGIONS: CPT

## 2020-06-30 PROCEDURE — 97110 THERAPEUTIC EXERCISES: CPT

## 2020-07-06 ENCOUNTER — HOSPITAL ENCOUNTER (OUTPATIENT)
Dept: PHYSICAL THERAPY | Age: 35
Discharge: HOME | End: 2020-07-06
Payer: OTHER GOVERNMENT

## 2020-07-06 PROCEDURE — 97110 THERAPEUTIC EXERCISES: CPT

## 2020-07-06 PROCEDURE — 97140 MANUAL THERAPY 1/> REGIONS: CPT

## 2020-07-06 NOTE — PROGRESS NOTES
Carmelo Rae Ramonepring  : 1985  Primary: [facility]  Secondary:  [address] at [facility] Therapy  [address], [address]  [phone]   [phone]       OUTPATIENT PHYSICAL THERAPY:Daily Note 2020      TREATMENT:   PT Patient Time In/Time Out  Time In: 1605  Time Out: 1654     Total Time: 47min    Subjective: Pt states her HAs are improving but her neck is still stiff when turnign to the left every morning    Objective: Therapeutic Exercise: (30min) Done in order to improve strength, ROM and understanding of current condition.     Date:   Date:   Date:   Date:     Activity/Exercise       Education    Sleeping, towel roll for pillow   UBE   x8min x8min   Chin Tuck DNF holds - 3min DNF holds - 2x3min  DNFholds 68f7cpf   Self SNAGs   3x30 R/L/fwd    Rows 3x15, 40lbs 3x10, 30lbs eyes focused on dot  3x10, green TB   Cervical Rotation AAROM 3m9b0pgt, R/L; supine on ball   8x5v1hsr, R/L; supine on ball   Cervical Retraction 3x10, yellow TB; supine 6x10, yellow TB; supine/sitting x30 6x10, yellow TB; supine/sitting   Cat-Cow x3min x3min x3min    Shoulder W  3x10, yellow TB supine eyes on dot  3x10, yellow TB   Bridges  3x10, eyes focused on dot     Thoracic Rotation   x20 B               Manual Therapy: (17min) Done in order to improve joint and soft tissue mobility,reduce muscle guarding, and decrease muscle tone   Date:   Date:   Date:   Date:     Type       Joint Mobilization · C5-7: grades II-IV, CPA/UPA; prone and supine  · C2-3: grades I-III, CPA · C5-7: grades III-IV, CPA/UPA  · C2-3: grades I-III, CPA C5-7: prone/supine, grades III-IV, CPA/UPA  C2-3: grades III-IV, CPA  Upper TS: grade V HVLA prone and supine C5-7: prone/supine, grades III-IV, CPA/UPA  C2-3: grades III-IV, CPA/UPA  Upper TS: grade V HVLA prone and supine   Soft Tissue Mobilization  Suboccipitals Suboccipitals        Modalities: (-) Done in order to reduce swelling and pain    Assessment: Pt with improvements in L cervical rotation PROM following MT in both total range and pain during with active and passive.  Improved endurance of cervical DNF muscles    Plan: continue per POC    Future Appointments   Date Time Provider Dave Elaine   7/9/2020  4:00 PM Agnieszka Garza, PT [facility]       Sveta Shrestha Time:        Georgette Calzada PT, DPT, OCS

## 2020-07-08 ENCOUNTER — HOSPITAL ENCOUNTER (OUTPATIENT)
Dept: GENERAL RADIOLOGY | Age: 35
Discharge: HOME | End: 2020-07-08
Payer: OTHER GOVERNMENT

## 2020-07-08 DIAGNOSIS — M54.2 NECK PAIN: ICD-10-CM

## 2020-07-08 PROCEDURE — 72040 X-RAY EXAM NECK SPINE 2-3 VW: CPT

## 2020-07-09 ENCOUNTER — HOSPITAL ENCOUNTER (OUTPATIENT)
Dept: PHYSICAL THERAPY | Age: 35
Discharge: HOME | End: 2020-07-09
Payer: OTHER GOVERNMENT

## 2020-07-09 PROCEDURE — 97140 MANUAL THERAPY 1/> REGIONS: CPT

## 2020-07-09 PROCEDURE — 97110 THERAPEUTIC EXERCISES: CPT

## 2020-07-09 NOTE — PROGRESS NOTES
Rios Sanches  : 1985  Primary: [facility]  Secondary:  [address] at [place] [address] Therapy  [address], [address], [address]  [phone]   [phone]       OUTPATIENT PHYSICAL THERAPY:Daily Note 2020      TREATMENT:   PT Patient Time In/Time Out  Time In: 1602  Time Out: 1649     Total Time: 47min    Subjective: Pt her neck is not as stiff when waking up in the morning now that she has added the towel roll. At work the other day she had to look down a lot which gave her a headache and bothered her neck, but it is not as bad today    Objective: Therapeutic Exercise: (13min) Done in order to improve strength, ROM and understanding of current condition.     Date:   Date:   Date:   Date:     Activity/Exercise       Education   Sleeping, towel roll for pillow    UBE  x8min x8min    Chin Tuck DNF holds - 2x3min  DNFholds 58o5ykg 3l68n5zin in quadruped   Self SNAGs  3x30 R/L/fwd     Rows 3x10, 30lbs eyes focused on dot  3x10, green TB 3x10, green TB   Cervical Rotation AAROM   0z8p2stt, R/L; supine on ball    Cervical Retraction 6x10, yellow TB; supine/sitting x30 6x10, yellow TB; supine/sitting    Cat-Cow x3min x3min  x30   Shoulder W 3x10, yellow TB supine eyes on dot  3x10, yellow TB    Bridges 3x10, eyes focused on dot      Thoracic Rotation  x20 B  x20 B              Manual Therapy: (23min) Done in order to improve joint and soft tissue mobility,reduce muscle guarding, and decrease muscle tone   Date:   Date:   Date:   Date:     Type       Joint Mobilization · C5-7: grades III-IV, CPA/UPA  · C2-3: grades I-III, CPA C5-7: prone/supine, grades III-IV, CPA/UPA  C2-3: grades III-IV, CPA  Upper TS: grade V HVLA prone and supine C5-7: prone/supine, grades III-IV, CPA/UPA  C2-3: grades III-IV, CPA/UPA  Upper TS: grade V HVLA prone and supine C2-7: supine, grades III-IV, CPA/UPA  T3-7: prone, grades III-IV, CPA/UPA Soft Tissue Mobilization Suboccipitals Suboccipitals         Modalities: (-) Done in order to reduce swelling and pain    Assessment: Pt with improved ROM following MT.  Better strength and control of chin tucks today even with the addition of increased resistance due to gravity    Plan: continue per POC    Future Appointments   Date Time Provider Dave Elaine   7/14/2020  8:45 AM Alonso Srinivasan DO [facility]       Unbilled Time:        Kelvin Shrestha PT, DPT, OCS

## 2020-07-14 ENCOUNTER — HOSPITAL ENCOUNTER (OUTPATIENT)
Dept: PHYSICAL THERAPY | Age: 35
Discharge: HOME | End: 2020-07-14
Payer: OTHER GOVERNMENT

## 2020-07-14 PROCEDURE — 97140 MANUAL THERAPY 1/> REGIONS: CPT

## 2020-07-14 PROCEDURE — 97110 THERAPEUTIC EXERCISES: CPT

## 2020-07-14 NOTE — PROGRESS NOTES
Joseline Sanches  : 1985  Primary: [facility]  Secondary:  [facility] at [facility]  [address], [address]  [phone]   [phone]       OUTPATIENT PHYSICAL THERAPY:Daily Note 2020      TREATMENT:   PT Patient Time In/Time Out  Time In: 0805  Time Out: 0029     Total Time: 34min  Visit Count:  7      Subjective: Pt states she is feeling less stiff in the mornings, but does have some residual stiffness while looking to the left. Hasn't had a headache since Friday    Objective: Therapeutic Exercise: (24min) Done in order to improve strength, ROM and understanding of current condition.     Date:   Date:   Date:   Date:     Activity/Exercise       Education  Sleeping, towel roll for pillow     UBE x8min x8min  x8min   Chin Tuck  DNFholds 07n8llc 7g51u9vcy in quadruped 7n29d1sle in quadruped   Self SNAGs 3x30 R/L/fwd      Rows  3x10, green TB 3x10, green TB 2x15, green TB   Cervical Rotation AAROM  [id number], R/L; supine on ball     Cervical Retraction x30 6x10, yellow TB; supine/sitting  3x10, seated yellow TB   Cat-Cow x3min  x30 x30   Shoulder W  3x10, yellow TB     Bridges       Thoracic Rotation x20 B  x20 B x20 in quadruped              Manual Therapy: (10min) Done in order to improve joint and soft tissue mobility,reduce muscle guarding, and decrease muscle tone   Date:   Date:   Date:   Date:     Type       Joint Mobilization C5-7: prone/supine, grades III-IV, CPA/UPA  C2-3: grades III-IV, CPA  Upper TS: grade V HVLA prone and supine C5-7: prone/supine, grades III-IV, CPA/UPA  C2-3: grades III-IV, CPA/UPA  Upper TS: grade V HVLA prone and supine C2-7: supine, grades III-IV, CPA/UPA  T3-7: prone, grades III-IV, CPA/UPA C3-7: UPA grades III-IV  T3-6: UPA grades III-IV   Soft Tissue Mobilization Suboccipitals          Modalities: (-) Done in order to reduce swelling and pain    Assessment: Pt continues to have improved symptoms following sessions.  Is gaining some endurance in the cervical muscles    Plan: continue per POC    Future Appointments   Date Time Provider Dave Henry   7/14/2020  8:45 AM Edwige Benitez,  SSA [facility]   7/21/2020  4:00 PM Nataliya Jose, PT [facility]   7/23/2020  4:00 PM Nataliya Jose PT [facility]       Marilee Cashing Time:        Beatrice Rivera PT, DPT, OCS

## 2020-07-21 ENCOUNTER — HOSPITAL ENCOUNTER (OUTPATIENT)
Dept: PHYSICAL THERAPY | Age: 35
Discharge: HOME | End: 2020-07-21
Payer: OTHER GOVERNMENT

## 2020-07-21 PROCEDURE — 97140 MANUAL THERAPY 1/> REGIONS: CPT

## 2020-07-21 PROCEDURE — 97110 THERAPEUTIC EXERCISES: CPT

## 2020-07-21 NOTE — PROGRESS NOTES
Shirley Packpring  : 1985  Primary: [facility]  Secondary:  [facility] at [facility]  [address], [facility], [address]  [phone]   [phone]       OUTPATIENT PHYSICAL THERAPY:Daily Note 2020      TREATMENT:   PT Patient Time In/Time Out  Time In: 1602  Time Out: 1645     Total Time: 3min  Visit Count:  8      Subjective: Pt states she has been feeling much better, no headaches for a week. Still has some stiffness, but improved    Objective: Therapeutic Exercise: (33min) Done in order to improve strength, ROM and understanding of current condition.     Date:   Date:   Date:        Activity/Exercise         Education         UBE  x8min x6min      Chin Tuck [id number] in quadruped 6a70o2dye in quadruped 5x10 in quadruped      Self SNAGs         Rows 3x10, green TB 2x15, green TB 3x15, green TB      Cervical Rotation AAROM         Cervical Retraction  3x10, seated yellow TB · 2x10 w/ cervical ext  · 3x10, seated red TB      Cat-Cow x30 x30       Shoulder W   1v21u5vcj, red TB      Bridges         Thoracic Rotation x20 B x20 in quadruped x20 in quadruped      [place]   3x10, red TB                                                                Manual Therapy: (10min) Done in order to improve joint and soft tissue mobility,reduce muscle guarding, and decrease muscle tone   Date:   Date:   Date:        Type         Joint Mobilization C2-7: supine, grades III-IV, CPA/UPA  T3-7: prone, grades III-IV, CPA/UPA C3-7: UPA grades III-IV  T3-6: UPA grades III-IV C4-7: UPA/lateral glide grades III-IV      Soft Tissue Mobilization             Modalities: (-) Done in order to reduce swelling and pain    Assessment: Pt continues to make improvements in cervical muscle strength and has decreased pain with movement following MT    Plan: continue per POC    Future Appointments   Date Time Provider Dave Henry 7/23/2020  8:00 AM Jena Carias PT [facility]   9/8/2020  4:15 PM SFE MRI UNIT 1 SHONNA Lou Time:        Nereyda Villagomez PT, DPT, OCS

## 2020-07-23 ENCOUNTER — HOSPITAL ENCOUNTER (OUTPATIENT)
Dept: PHYSICAL THERAPY | Age: 35
Discharge: HOME | End: 2020-07-23
Payer: OTHER GOVERNMENT

## 2020-07-23 PROCEDURE — 97140 MANUAL THERAPY 1/> REGIONS: CPT

## 2020-07-23 PROCEDURE — 97110 THERAPEUTIC EXERCISES: CPT

## 2020-07-23 NOTE — PROGRESS NOTES
Julián Packpring  : 1985  [facility]  Secondary[facility]  [facility]  [address], [address]  [phone]   [phone]       OUTPATIENT PHYSICAL THERAPY:Daily Note 2020      TREATMENT:   PT Patient Time In/Time Out  Time In: 0810  Time Out: 0850     Total Time: 40min  Visit Count:  9      Subjective: Pt states she feels that she is about 90% better overall, just has some minor lingering stiffness    Objective: Therapeutic Exercise: (30min) Done in order to improve strength, ROM and understanding of current condition.     Date:   Date:   Date:   Date:       Activity/Exercise         Education         UBE  x8min x6min x6min     Chin Tuck 4p97u2wqi in quadruped 3p11z2eps in quadruped 5x10 in quadruped 2x30 in quadruped (x30 w/ red TB)     Self SNAGs         Rows 3x10, green TB 2x15, green TB 3x15, green TB 3x15, green TB     Cervical Rotation AAROM         Cervical Retraction  3x10, seated yellow TB · 2x10 w/ cervical ext  · 3x10, seated red TB · x3min DNF holds     Cat-Cow x30 x30  x35     Shoulder W   [id number], red TB 673q9dkb, red TB     Bridges         Thoracic Rotation x20 B x20 in quadruped x20 in quadruped      [place] and [place]   3x10, red TB 2x15, red TB                                                               Manual Therapy: (10min) Done in order to improve joint and soft tissue mobility,reduce muscle guarding, and decrease muscle tone   Date:   Date:   Date:   Date:       Type         Joint Mobilization C2-7: supine, grades III-IV, CPA/UPA  T3-7: prone, grades III-IV, CPA/UPA C3-7: UPA grades III-IV  T3-6: UPA grades III-IV C4-7: UPA/lateral glide grades III-IV C5-7: UPA/lateral glides, grades III-IV     Soft Tissue Mobilization             Modalities: (-) Done in order to reduce swelling and pain    Assessment: Pt had decreased stiffness subjectively following MT    Plan: continue per POC    Future Appointments   Date Time Provider Dave Elaine   9/8/2020  4:15 PM S[facility] MRI UNIT 1 [facility]       Unbilled Time:        Tatyana Bay PT, DPT, OCS

## 2020-07-31 ENCOUNTER — HOSPITAL ENCOUNTER (OUTPATIENT)
Dept: PHYSICAL THERAPY | Age: 35
Discharge: HOME | End: 2020-07-31
Payer: OTHER GOVERNMENT

## 2020-07-31 PROCEDURE — 97110 THERAPEUTIC EXERCISES: CPT

## 2020-07-31 PROCEDURE — 97140 MANUAL THERAPY 1/> REGIONS: CPT

## 2020-07-31 NOTE — PROGRESS NOTES
Liyah Sanches  : 1985  [facility]  Secondary:  [address] at [facility]  [address], [address]  [phone]   [phone]       OUTPATIENT PHYSICAL THERAPY:Daily Note 2020      TREATMENT:   PT Patient Time In/Time Out  Time In: 1600     Total Time: 40min  Visit Count:  10      Subjective: Pt states she has not had a headache recently, just minor neck stiffness    Objective: Therapeutic Exercise: (30min) Done in order to improve strength, ROM and understanding of current condition.     Date:   Date:   Date:   Date:   Date:      Activity/Exercise         Education         UBE  x8min x6min x6min x6min    Chin Tuck [id number] in quadruped 5c50z8bhu in quadruped 5x10 in quadruped 2x30 in quadruped (x30 w/ red TB) 2x30 in quadruped (x30 w/ red TB)    Self SNAGs         Rows 3x10, green TB 2x15, green TB 3x15, green TB 3x15, green TB     Cervical Rotation AAROM         Cervical Retraction  3x10, seated yellow TB · 2x10 w/ cervical ext  · 3x10, seated red TB · x3min DNF holds     Cat-Cow x30 x30  x35     Shoulder W   3h57d2eho, red TB 608g8tur, red TB     Bridges         Thoracic Rotation x20 B x20 in quadruped x20 in quadruped  x20 in quadruped    [place] and [place]   3x10, red TB 2x15, red TB     Bent Over Row     3x10, 10lbs    Bird Dogs     3x8, arms only    Shoulder Horizontal ABD     04u8gnq, red TB                                   Manual Therapy: (12min) Done in order to improve joint and soft tissue mobility,reduce muscle guarding, and decrease muscle tone   Date:   Date:   Date:   Date:   Date:      Type         Joint Mobilization C2-7: supine, grades III-IV, CPA/UPA  T3-7: prone, grades III-IV, CPA/UPA C3-7: UPA grades III-IV  T3-6: UPA grades III-IV C4-7: UPA/lateral glide grades III-IV C5-7: UPA/lateral glides, grades III-IV C5-7: UPA/lateral glides, grades III-IV Soft Tissue Mobilization             Modalities: (-) Done in order to reduce swelling and pain    Assessment: Pt with improvements in joint mobility and cervical/posterior chain strength    Plan: continue per POC    Future Appointments   Date Time Provider Dave Henry   8/3/2020  4:00 PM [facility]   9/8/2020  4:15 PM SFE MRI UNIT 1 [facility] SFE       Unbilled Time:        Merle Lema PT, DPT, OCS

## 2020-08-03 ENCOUNTER — HOSPITAL ENCOUNTER (OUTPATIENT)
Dept: PHYSICAL THERAPY | Age: 35
Discharge: HOME | End: 2020-08-03
Payer: OTHER GOVERNMENT

## 2020-08-03 PROCEDURE — 97140 MANUAL THERAPY 1/> REGIONS: CPT

## 2020-08-03 PROCEDURE — 97110 THERAPEUTIC EXERCISES: CPT

## 2020-08-03 NOTE — PROGRESS NOTES
Glenn Sanches  : 1985  Primary: [facility]  Secondary:  [facility] at [facility]  [address], [address]  [phone]   [phone]       OUTPATIENT PHYSICAL THERAPY:Daily Note 8/3/2020      TREATMENT:   PT Patient Time In/Time Out  Time In: 3511  Time Out: 1651     Total Time: 46min  Visit Count:  11      Subjective: See Re-Evaluation note dated 8/3/2020 for details    Objective: See Re-Evaluation note dated 8/3/2020 for details      Therapeutic Exercise: (35min) Done in order to improve strength, ROM and understanding of current condition.     Date:   Date:   Date:   Date:   Date:   Date:  8/3   Activity/Exercise         Education/Reassessment      x15min   UBE  x8min x6min x6min x6min x6min   Chin Tuck 5u71q1lot in quadruped 3v36w6cex in quadruped 5x10 in quadruped 2x30 in quadruped (x30 w/ red TB) 2x30 in quadruped (x30 w/ red TB) 2x30 in quadruped (x30 w/ red TB)  3x10 hold for endurance in supine DNF hold   Self SNAGs         Rows 3x10, green TB 2x15, green TB 3x15, green TB 3x15, green TB     Cervical Rotation AAROM         Cervical Retraction  3x10, seated yellow TB · 2x10 w/ cervical ext  · 3x10, seated red TB · x3min DNF holds     Cat-Cow x30 x30  x35     Shoulder W   7d47r9jbn, red TB 903z9oxv, red TB     Bridges         Thoracic Rotation x20 B x20 in quadruped x20 in quadruped  x20 in quadruped    [place] and [place]   3x10, red TB 2x15, red TB     Bent Over Row     3x10, 10lbs    Bird Dogs     3x8, arms only 2x8, arms   Shoulder Horizontal ABD     62u7dxb, red TB 90j2uam, red TB   Seated Thoracic Extension      2x30 upper and mid                         Manual Therapy: (10min) Done in order to improve joint and soft tissue mobility,reduce muscle guarding, and decrease muscle tone   Date:   Date:   Date:   Date:   Date:   Date:  8/3   Type         Joint Mobilization C2-7: supine, grades III-IV, CPA/UPA  T3-7: prone, grades III-IV, CPA/UPA C3-7: UPA grades III-IV  T3-6: UPA grades III-IV C4-7: UPA/lateral glide grades III-IV C5-7: UPA/lateral glides, grades III-IV C5-7: UPA/lateral glides, grades III-IV C5-7: UPA/CPA grades III-IV  TS grade V in supine   Soft Tissue Mobilization             Modalities: (-) Done in order to reduce swelling and pain    Assessment: See Re-Evaluation note dated 8/3/2020 for details    Plan: See Re-Evaluation note dated 8/3/2020 for details    Future Appointments   Date Time Provider Dave Elaine   9/8/2020  4:15 PM SFE MRI UNIT 1 [facility]       Unbilled Time:        Rosendo Soares PT, DPT, OCS
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
## 2020-08-06 ENCOUNTER — HOSPITAL ENCOUNTER (OUTPATIENT)
Dept: PHYSICAL THERAPY | Age: 35
Discharge: HOME | End: 2020-08-06
Payer: OTHER GOVERNMENT

## 2020-08-06 PROCEDURE — 97140 MANUAL THERAPY 1/> REGIONS: CPT

## 2020-08-06 PROCEDURE — 97110 THERAPEUTIC EXERCISES: CPT

## 2020-08-06 NOTE — PROGRESS NOTES
Stephane Flakes Stinespring  : 1985  Primary: [facility] [facility]  Secondary:  [facility]  [address], [address]  [phone]   [phone]       OUTPATIENT PHYSICAL THERAPY:Daily Note 2020      TREATMENT:   PT Patient Time In/Time Out  Time In: 0915  Time Out: 1000     Total Time: 46min  Visit Count:  12      Subjective: Patient reports neck feels about the same still tight, but having more discomfort in shoulder blade area today. Objective: (None Today)      Therapeutic Exercise: (35min) Done in order to improve strength, ROM and understanding of current condition.     Date:   Date:   Date:   Date:   Date:  8/3 Date  20   Activity/Exercise         Education/Reassessment     x15min X 15   UBE x8min x6min x6min x6min x6min X 6   Chin Tuck [id number] in quadruped 5x10 in quadruped 2x30 in quadruped (x30 w/ red TB) 2x30 in quadruped (x30 w/ red TB) 2x30 in quadruped (x30 w/ red TB)  3x10 hold for endurance in supine DNF hold 2x30 in quadruped (x30 w/ red TB)  3x10 hold for endurance in supine DNF hold   Self SNAGs         Rows 2x15, green TB 3x15, green TB 3x15, green TB   3 x 15  Red sports cord   Cervical Rotation AAROM         Cervical Retraction 3x10, seated yellow TB · 2x10 w/ cervical ext  · 3x10, seated red TB · x3min DNF holds   · 2x10 w/ cervical ext  · 3x10, seated red TB   Cat-Cow x30  x35      Shoulder W  [id number], red TB 130e2nbo, red TB      Bridges         Thoracic Rotation x20 in quadruped x20 in quadruped  x20 in quadruped     [place] and [place]  3x10, red TB 2x15, red TB      Bent Over Row    3x10, 10lbs     Bird Dogs    3x8, arms only 2x8, arms 2x8, arms   Shoulder Horizontal ABD    51g6wji, red TB 35o8wfv, red TB 01v3dfj, red TB   Seated Thoracic Extension     2x30 upper and mid 2x30 upper and mid                         Manual Therapy: (10min) Done in order to improve joint and soft tissue mobility,reduce muscle guarding, and decrease muscle tone   Date:  7/21 Date:  7/23 Date:  7/31 Date:  8/3 Date  8-6   Type        Joint Mobilization C4-7: UPA/lateral glide grades III-IV C5-7: UPA/lateral glides, grades III-IV C5-7: UPA/lateral glides, grades III-IV C5-7: UPA/CPA grades III-IV  TS grade V in supine C5-7: UPA/CPA grades III-IV  TS grade V in supine   Soft Tissue Mobilization            Modalities: (-) Done in order to reduce swelling and pain    Assessment: Patient tolerated treatment with mild discomfort. Patient still has some tightness especially along the right, but demonstrated better ROM as treatment progressed. Good compliance with home exercises.     Plan: See Re-Evaluation note dated 8/3/2020 for details    Future Appointments   Date Time Provider Dave Henry   8/14/2020 12:00 PM Amairani Flannery, PT [facility]   8/20/2020 12:00 PM Amairani Flannery, PT [facility]   8/25/2020 12:00 PM Amairani Flannery, PT [facility]   9/8/2020  4:15 PM [facility] MRI UNIT 1 [facility]       Apryl Hallmark Time:        Alysa Phillips PTA

## 2020-08-14 ENCOUNTER — HOSPITAL ENCOUNTER (OUTPATIENT)
Dept: PHYSICAL THERAPY | Age: 35
Discharge: HOME | End: 2020-08-14
Payer: OTHER GOVERNMENT

## 2020-08-14 PROCEDURE — 97110 THERAPEUTIC EXERCISES: CPT

## 2020-08-14 NOTE — PROGRESS NOTES
Son Sanches  : 1985  [facility]  Secondary:  [address] at [facility]  [address], [facility], [address]  [phone]   [phone]       OUTPATIENT PHYSICAL THERAPY:Daily Note 2020      TREATMENT:   PT Patient Time In/Time Out  Time In: 1200  Time Out: 1245     Total Time: 45min  Visit Count:  13      Subjective: Patient states overall she is doing better. Over the last week she has started an online class and she is sitting at the computer all day. She has noticed that in the morning her neck has been a little more stiff    Objective: (None Today)      Therapeutic Exercise: (45min) Done in order to improve strength, ROM and understanding of current condition.     Date:   Date:  8/3 Date  20 Date:       Activity/Exercise         Education/Reassessment  x15min X 15      UBE x6min x6min X 6 x6min     Chin Tuck 2x30 in quadruped (x30 w/ red TB) 2x30 in quadruped (x30 w/ red TB)  3x10 hold for endurance in supine DNF hold 2x30 in quadruped (x30 w/ red TB)  3x10 hold for endurance in supine DNF hold 3x15 in quadruped     Self SNAGs         Rows   3 x 15  Red sports cord 3x15, green TB     Cervical Rotation AAROM         Cervical Retraction   · 2x10 w/ cervical ext  · 3x10, seated red TB · x20 w/ balls behind neck standing  · x20 w/ balls behind neck supine ·  ·    Cat-Cow         Shoulder W    3x15     Bridges         Thoracic Rotation x20 in quadruped        [place] and [place]         [place] Over Row 3x10, 10lbs        Bird Dogs 3x8, arms only 2x8, arms 2x8, arms      Shoulder Horizontal ABD 90s5rlm, red TB 14h5tmd, red TB 55g7sxd, red TB 3x15, red TB     Seated Thoracic Extension  2x30 upper and mid 2x30 upper and mid 2x30 w/ half foam                           Manual Therapy: (0min) Done in order to improve joint and soft tissue mobility,reduce muscle guarding, and decrease muscle tone   Date:   Date:   Date:  7/31 Date:  8/3 Date  8-6   Type        Joint Mobilization C4-7: UPA/lateral glide grades III-IV C5-7: UPA/lateral glides, grades III-IV C5-7: UPA/lateral glides, grades III-IV C5-7: UPA/CPA grades III-IV  TS grade V in supine C5-7: UPA/CPA grades III-IV  TS grade V in supine   Soft Tissue Mobilization            Modalities: (-) Done in order to reduce swelling and pain    Assessment: Patient educated on breaking up long periods of sitting with exercise in order to avoid stiffness    Plan: continue per POC    Future Appointments   Date Time Provider Dave Henry   8/20/2020 12:00 PM JAM [facility]   8/25/2020 12:00 PM JAM [facility]   9/8/2020  4:15 PM [facility] MRI UNIT 1 [facility] Time:        Riki Mayer PTA

## 2020-08-20 ENCOUNTER — HOSPITAL ENCOUNTER (OUTPATIENT)
Dept: PHYSICAL THERAPY | Age: 35
Discharge: HOME | End: 2020-08-20
Payer: OTHER GOVERNMENT

## 2020-08-20 PROCEDURE — 97110 THERAPEUTIC EXERCISES: CPT

## 2020-08-20 NOTE — PROGRESS NOTES
Jolie Sanches  : 1985  Primary: [facility]  Secondary:  [facility] [address], [address]  [phone]   [phone]       OUTPATIENT PHYSICAL THERAPY:Daily Note 2020      TREATMENT:   PT Patient Time In/Time Out  Time In: 1205  Time Out: 1248     Total Time: 43min  Visit Count:  14      Subjective: Patient states she hasn't had any stiffness in her neck over the last week. Has moved her computer up so she isn't having to look down at it, which is helping    Objective: (None Today)      Therapeutic Exercise: (43min) Done in order to improve strength, ROM and understanding of current condition.     Date:   Date:  8/3 Date  20 Date:   Date:      Activity/Exercise         Education/Reassessment  x15min X 15      UBE x6min x6min X 6 x6min x6min    Chin Tuck 2x30 in quadruped (x30 w/ red TB) 2x30 in quadruped (x30 w/ red TB)  3x10 hold for endurance in supine DNF hold 2x30 in quadruped (x30 w/ red TB)  3x10 hold for endurance in supine DNF hold 3x15 in quadruped · 2x20 in quadruped (x20 w/ red TB)  · DNF holds 4x10 for duration    Self SNAGs         Rows   3 x 15  Red sports cord 3x15, green TB 3x10, blue TB    Cervical Rotation AAROM         Cervical Retraction   · 2x10 w/ cervical ext  · 3x10, seated red TB · x20 w/ balls behind neck standing  · x20 w/ balls behind neck supine ·  ·    Cat-Cow     x20    Shoulder W    3x15 3x15, red TB    Bridges         Thoracic Rotation x20 in quadruped        [place] and [place]         Neupré Over Row 3x10, 10lbs        Bird Dogs 3x8, arms only 2x8, arms 2x8, arms  3x8, 2x w/ arms 1x normal    Shoulder Horizontal ABD 73l1kto, red TB 72l4hvy, red TB 42a4kpb, red TB 3x15, red TB 3x15, red TB    Seated Thoracic Extension  2x30 upper and mid 2x30 upper and mid 2x30 w/ half foam                           Manual Therapy: (0min) Done in order to improve joint and soft tissue mobility,reduce muscle guarding, and decrease muscle tone   Date:  7/21 Date:  7/23 Date:  7/31 Date:  8/3 Date  8-6   Type        Joint Mobilization C4-7: UPA/lateral glide grades III-IV C5-7: UPA/lateral glides, grades III-IV C5-7: UPA/lateral glides, grades III-IV C5-7: UPA/CPA grades III-IV  TS grade V in supine C5-7: UPA/CPA grades III-IV  TS grade V in supine   Soft Tissue Mobilization            Modalities: (-) Done in order to reduce swelling and pain    Assessment: Patient continues to gain endurance of the DNF muscles. Improved posture without cuing.     Plan: continue per POC    Future Appointments   Date Time Provider Dave Henry   8/25/2020 12:00 PM [facility]   9/8/2020  4:15 PM [facility] MRI UNIT 1 [facility]       Unbilled Time:        Roger Jackson, PTA

## 2020-08-25 ENCOUNTER — HOSPITAL ENCOUNTER (OUTPATIENT)
Dept: PHYSICAL THERAPY | Age: 35
Discharge: HOME | End: 2020-08-25
Payer: OTHER GOVERNMENT

## 2020-08-25 PROCEDURE — 97110 THERAPEUTIC EXERCISES: CPT

## 2020-08-25 NOTE — PROGRESS NOTES
Geri Sanches  : 1985  Primary: [facility]  Secondary:  [facility]  [address], [address], [address]  [phone]   [phone]       OUTPATIENT PHYSICAL THERAPY:Daily Note 2020      TREATMENT:   PT Patient Time In/Time Out  Time In: 1205  Time Out: 1245     Total Time: 40min  Visit Count:  15      Subjective: See Discharge Summary dated 2020 for details    Objective: See Discharge Summary dated 2020 for details      Therapeutic Exercise: (40min) Done in order to improve strength, ROM and understanding of current condition.     Date:   Date:  8/3 Date  20 Date:   Date:      Activity/Exercise         Education/Reassessment  x15min X 15   D/C assessment, HEP review   UBE x6min x6min X 6 x6min x6min x8min   Chin Tuck 2x30 in quadruped (x30 w/ red TB) 2x30 in quadruped (x30 w/ red TB)  3x10 hold for endurance in supine DNF hold 2x30 in quadruped (x30 w/ red TB)  3x10 hold for endurance in supine DNF hold 3x15 in quadruped · 2x20 in quadruped (x20 w/ red TB)  · DNF holds 4x10 for duration    Self SNAGs      x3min   Rows   3 x 15  Red sports cord 3x15, green TB 3x10, blue TB    Cervical Rotation AAROM         Cervical Retraction   · 2x10 w/ cervical ext  · 3x10, seated red TB · x20 w/ balls behind neck standing  · x20 w/ balls behind neck supine ·  ·    Cat-Cow     x20    Shoulder W    3x15 3x15, red TB    Bridges         Thoracic Rotation x20 in quadruped        [place] and [place]         Neupré Over Row 3x10, 10lbs        Bird Dogs 3x8, arms only 2x8, arms 2x8, arms  3x8, 2x w/ arms 1x normal    Shoulder Horizontal ABD 92s0hgf, red TB 33n0tax, red TB 38j0blc, red TB 3x15, red TB 3x15, red TB    Seated Thoracic Extension  2x30 upper and mid 2x30 upper and mid 2x30 w/ half foam     Low Rows      3x15 red TB                Manual Therapy: (0min) Done in order to improve joint and soft tissue mobility,reduce muscle guarding, and decrease muscle tone   Date:  7/21 Date:  7/23 Date:  7/31 Date:  8/3 Date  8-6   Type        Joint Mobilization C4-7: UPA/lateral glide grades III-IV C5-7: UPA/lateral glides, grades III-IV C5-7: UPA/lateral glides, grades III-IV C5-7: UPA/CPA grades III-IV  TS grade V in supine C5-7: UPA/CPA grades III-IV  TS grade V in supine   Soft Tissue Mobilization            Modalities: (-) Done in order to reduce swelling and pain    Assessment: See Discharge Summary dated 8/25/2020 for details.     Plan: See Discharge Summary dated 8/25/2020 for details    Future Appointments   Date Time Provider Dave Henry   9/8/2020  4:15 PM [facility] MRI UNIT 1 [facility]       Unbilled Time:        Kristal Hughes PT, DPT, OCS
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
## 2020-09-08 ENCOUNTER — HOSPITAL ENCOUNTER (OUTPATIENT)
Dept: MRI IMAGING | Age: 35
Discharge: HOME | End: 2020-09-08
Attending: FAMILY MEDICINE
Payer: OTHER GOVERNMENT

## 2020-09-08 DIAGNOSIS — Z80.3 FAMILY HISTORY OF BREAST CANCER IN MOTHER: ICD-10-CM

## 2020-09-08 PROCEDURE — 74011000258 HC RX REV CODE- 258: Performed by: FAMILY MEDICINE

## 2020-09-08 PROCEDURE — 74011250636 HC RX REV CODE- 250/636: Performed by: FAMILY MEDICINE

## 2020-09-08 PROCEDURE — 77049 MRI BREAST C-+ W/CAD BI: CPT

## 2020-09-08 PROCEDURE — A9575 INJ GADOTERATE MEGLUMI 0.1ML: HCPCS | Performed by: FAMILY MEDICINE

## 2020-09-08 RX ORDER — GADOTERATE MEGLUMINE 376.9 MG/ML
15 INJECTION INTRAVENOUS
Status: COMPLETED | OUTPATIENT
Start: 2020-09-08 | End: 2020-09-08

## 2020-09-08 RX ORDER — SODIUM CHLORIDE 0.9 % (FLUSH) 0.9 %
10 SYRINGE (ML) INJECTION
Status: COMPLETED | OUTPATIENT
Start: 2020-09-08 | End: 2020-09-08

## 2020-09-08 RX ADMIN — Medication 10 ML: at 16:53

## 2020-09-08 RX ADMIN — GADOTERATE MEGLUMINE 15 ML: 376.9 INJECTION INTRAVENOUS at 16:53

## 2020-09-08 RX ADMIN — SODIUM CHLORIDE 100 ML: 900 INJECTION, SOLUTION INTRAVENOUS at 16:53

## 2021-07-19 ENCOUNTER — HOSPITAL ENCOUNTER (OUTPATIENT)
Dept: LAB | Age: 36
Discharge: HOME | End: 2021-07-19
Payer: OTHER GOVERNMENT

## 2021-07-19 DIAGNOSIS — Z13.220 SCREENING CHOLESTEROL LEVEL: ICD-10-CM

## 2021-07-19 DIAGNOSIS — N95.9 PREMENOPAUSAL PATIENT: ICD-10-CM

## 2021-07-19 DIAGNOSIS — Z13.1 SCREENING FOR DIABETES MELLITUS: ICD-10-CM

## 2021-07-19 LAB
ALBUMIN SERPL-MCNC: 3.6 G/DL (ref 3.5–5)
ALBUMIN/GLOB SERPL: 0.9 {RATIO} (ref 1.2–3.5)
ALP SERPL-CCNC: 79 U/L (ref 50–136)
ALT SERPL-CCNC: 36 U/L (ref 12–65)
ANION GAP SERPL CALC-SCNC: 5 MMOL/L (ref 7–16)
AST SERPL-CCNC: 18 U/L (ref 15–37)
BASOPHILS # BLD: 0 K/UL (ref 0–0.2)
BASOPHILS NFR BLD: 1 % (ref 0–2)
BILIRUB SERPL-MCNC: 0.6 MG/DL (ref 0.2–1.1)
BUN SERPL-MCNC: 9 MG/DL (ref 6–23)
CALCIUM SERPL-MCNC: 9 MG/DL (ref 8.3–10.4)
CHLORIDE SERPL-SCNC: 106 MMOL/L (ref 98–107)
CHOLEST SERPL-MCNC: 210 MG/DL
CO2 SERPL-SCNC: 27 MMOL/L (ref 21–32)
CREAT SERPL-MCNC: 0.64 MG/DL (ref 0.6–1)
DIFFERENTIAL METHOD BLD: NORMAL
EOSINOPHIL # BLD: 0.2 K/UL (ref 0–0.8)
EOSINOPHIL NFR BLD: 3 % (ref 0.5–7.8)
ERYTHROCYTE [DISTWIDTH] IN BLOOD BY AUTOMATED COUNT: 12.9 % (ref 11.9–14.6)
GLOBULIN SER CALC-MCNC: 4 G/DL (ref 2.3–3.5)
GLUCOSE SERPL-MCNC: 101 MG/DL (ref 65–100)
HCT VFR BLD AUTO: 40.2 % (ref 35.8–46.3)
HDLC SERPL-MCNC: 45 MG/DL (ref 40–60)
HDLC SERPL: 4.7 {RATIO}
HGB BLD-MCNC: 13.1 G/DL (ref 11.7–15.4)
IMM GRANULOCYTES # BLD AUTO: 0 K/UL (ref 0–0.5)
IMM GRANULOCYTES NFR BLD AUTO: 0 % (ref 0–5)
LDLC SERPL CALC-MCNC: 128 MG/DL
LYMPHOCYTES # BLD: 1.7 K/UL (ref 0.5–4.6)
LYMPHOCYTES NFR BLD: 25 % (ref 13–44)
MCH RBC QN AUTO: 27.3 PG (ref 26.1–32.9)
MCHC RBC AUTO-ENTMCNC: 32.6 G/DL (ref 31.4–35)
MCV RBC AUTO: 83.8 FL (ref 79.6–97.8)
MONOCYTES # BLD: 0.4 K/UL (ref 0.1–1.3)
MONOCYTES NFR BLD: 6 % (ref 4–12)
NEUTS SEG # BLD: 4.4 K/UL (ref 1.7–8.2)
NEUTS SEG NFR BLD: 65 % (ref 43–78)
NRBC # BLD: 0 K/UL (ref 0–0.2)
PLATELET # BLD AUTO: 245 K/UL (ref 150–450)
PMV BLD AUTO: 10.1 FL (ref 9.4–12.3)
POTASSIUM SERPL-SCNC: 4 MMOL/L (ref 3.5–5.1)
PROT SERPL-MCNC: 7.6 G/DL (ref 6.3–8.2)
RBC # BLD AUTO: 4.8 M/UL (ref 4.05–5.2)
SODIUM SERPL-SCNC: 138 MMOL/L (ref 136–145)
TRIGL SERPL-MCNC: 185 MG/DL (ref 35–150)
VLDLC SERPL CALC-MCNC: 37 MG/DL (ref 6–23)
WBC # BLD AUTO: 6.8 K/UL (ref 4.3–11.1)

## 2021-07-19 PROCEDURE — 80061 LIPID PANEL: CPT

## 2021-07-19 PROCEDURE — 85025 COMPLETE CBC W/AUTO DIFF WBC: CPT

## 2021-07-19 PROCEDURE — 36415 COLL VENOUS BLD VENIPUNCTURE: CPT

## 2021-07-19 PROCEDURE — 80053 COMPREHEN METABOLIC PANEL: CPT

## 2021-08-06 ENCOUNTER — TRANSCRIBE ORDER (OUTPATIENT)
Dept: SCHEDULING | Age: 36
End: 2021-08-06

## 2021-08-06 DIAGNOSIS — Z12.31 SCREENING MAMMOGRAM FOR HIGH-RISK PATIENT: Primary | ICD-10-CM

## 2021-08-17 ENCOUNTER — HOSPITAL ENCOUNTER (OUTPATIENT)
Dept: MAMMOGRAPHY | Age: 36
Discharge: HOME | End: 2021-08-17
Attending: FAMILY MEDICINE
Payer: OTHER GOVERNMENT

## 2021-08-17 DIAGNOSIS — Z12.31 SCREENING MAMMOGRAM FOR HIGH-RISK PATIENT: ICD-10-CM

## 2021-08-17 PROCEDURE — 77067 SCR MAMMO BI INCL CAD: CPT

## 2022-03-19 PROBLEM — Z79.891 ADMISSION FOR LONG-TERM OPIATE ANALGESIC USE: Status: ACTIVE | Noted: 2018-08-20

## 2022-03-19 PROBLEM — Z51.81 ENCOUNTER FOR THERAPEUTIC DRUG MONITORING: Status: ACTIVE | Noted: 2018-08-20

## 2022-03-19 PROBLEM — Z30.09 ENCOUNTER FOR EVALUATION REGARDING CONTRACEPTION OPTIONS: Status: ACTIVE | Noted: 2018-02-15

## 2022-07-12 ENCOUNTER — OFFICE VISIT (OUTPATIENT)
Dept: FAMILY MEDICINE CLINIC | Facility: CLINIC | Age: 37
End: 2022-07-12
Payer: OTHER GOVERNMENT

## 2022-07-12 VITALS
SYSTOLIC BLOOD PRESSURE: 118 MMHG | RESPIRATION RATE: 16 BRPM | DIASTOLIC BLOOD PRESSURE: 78 MMHG | WEIGHT: 174 LBS | BODY MASS INDEX: 30.83 KG/M2 | HEIGHT: 63 IN

## 2022-07-12 DIAGNOSIS — F90.2 ATTENTION DEFICIT HYPERACTIVITY DISORDER (ADHD), COMBINED TYPE: Primary | ICD-10-CM

## 2022-07-12 DIAGNOSIS — J33.9 NASAL POLYP: ICD-10-CM

## 2022-07-12 DIAGNOSIS — Z13.1 SCREENING FOR DIABETES MELLITUS: ICD-10-CM

## 2022-07-12 DIAGNOSIS — N95.9 PREMENOPAUSAL PATIENT: ICD-10-CM

## 2022-07-12 DIAGNOSIS — Z13.220 SCREENING CHOLESTEROL LEVEL: ICD-10-CM

## 2022-07-12 DIAGNOSIS — Z11.59 NEED FOR HEPATITIS C SCREENING TEST: ICD-10-CM

## 2022-07-12 PROCEDURE — 99214 OFFICE O/P EST MOD 30 MIN: CPT | Performed by: FAMILY MEDICINE

## 2022-07-12 RX ORDER — METHYLPHENIDATE HYDROCHLORIDE 36 MG/1
36 TABLET ORAL EVERY MORNING
Qty: 30 TABLET | Refills: 0 | Status: SHIPPED | OUTPATIENT
Start: 2022-07-12 | End: 2022-08-11

## 2022-07-12 RX ORDER — METHYLPHENIDATE HYDROCHLORIDE 36 MG/1
36 TABLET ORAL DAILY
Qty: 30 TABLET | Refills: 0 | Status: SHIPPED | OUTPATIENT
Start: 2022-08-12 | End: 2022-09-11

## 2022-07-12 RX ORDER — METHYLPHENIDATE HYDROCHLORIDE 36 MG/1
36 TABLET ORAL DAILY
Qty: 30 TABLET | Refills: 0 | Status: SHIPPED | OUTPATIENT
Start: 2022-09-12 | End: 2022-10-12

## 2022-07-12 RX ORDER — METHYLPHENIDATE HYDROCHLORIDE 36 MG/1
TABLET ORAL
COMMUNITY
Start: 2022-06-02 | End: 2022-07-12 | Stop reason: SDUPTHER

## 2022-07-12 ASSESSMENT — ENCOUNTER SYMPTOMS
ABDOMINAL PAIN: 0
VOMITING: 0
SHORTNESS OF BREATH: 0
NAUSEA: 0
DIARRHEA: 0
COUGH: 0
CONSTIPATION: 0

## 2022-07-12 ASSESSMENT — PATIENT HEALTH QUESTIONNAIRE - PHQ9
SUM OF ALL RESPONSES TO PHQ QUESTIONS 1-9: 0
2. FEELING DOWN, DEPRESSED OR HOPELESS: 0
SUM OF ALL RESPONSES TO PHQ9 QUESTIONS 1 & 2: 0
1. LITTLE INTEREST OR PLEASURE IN DOING THINGS: 0
SUM OF ALL RESPONSES TO PHQ QUESTIONS 1-9: 0

## 2022-07-12 NOTE — PROGRESS NOTES
PROGRESS NOTE    SUBJECTIVE:   Emmy Guevara is a 44 y.o. female seen for a follow up visit regarding [unfilled]    54-year-old  female here for follow-up of ADHD. She has been on medication for several months. She is in the [employer] and went to [place] months. She had enough to last her until January, but has not had any since then. She does notice a difference when not being on it. She has no side effects with the Concerta. She has noticed a nasal polyp in her right nostril. It actually has came down before when she was very sick and going her nose a lot. She always pulled it off, but realized it was attached. She feels it up in her nose and it is bothersome. She would like it removed. Past Medical History, Past Surgical History, Family history, Social History, and Medications were all reviewed with the patient today and updated as necessary. Current Outpatient Medications   Medication Sig Dispense Refill    methylphenidate (CONCERTA) 36 MG extended release tablet Take 1 tablet by mouth every morning for 30 days. 30 tablet 0    [START ON 2022] methylphenidate (CONCERTA) 36 MG extended release tablet Take 1 tablet by mouth daily for 30 days. 30 tablet 0    [START ON 2022] methylphenidate (CONCERTA) 36 MG extended release tablet Take 1 tablet by mouth daily for 30 days. 30 tablet 0     No current facility-administered medications for this visit.      No Known Allergies  Patient Active Problem List   Diagnosis    Admission for long-term opiate analgesic use    Encounter for therapeutic drug monitoring    Encounter for evaluation regarding contraception options     Past Medical History:   Diagnosis Date    ADHD      Past Surgical History:   Procedure Laterality Date     SECTION      ,,    CHOLECYSTECTOMY, LAPAROSCOPIC      OTHER SURGICAL HISTORY      lapo and tummy tuck    WISDOM TOOTH EXTRACTION      all 4     Social History Tobacco Use    Smoking status: Never Smoker    Smokeless tobacco: Never Used   Substance Use Topics    Alcohol use: No         Review of Systems   Constitutional: Negative for chills, fatigue and fever. HENT:        Nasal polyp   Respiratory: Negative for cough and shortness of breath. Cardiovascular: Negative for chest pain and palpitations. Gastrointestinal: Negative for abdominal pain, constipation, diarrhea, nausea and vomiting. Neurological: Negative for dizziness and headaches. Psychiatric/Behavioral: Negative for sleep disturbance. The patient is not nervous/anxious. OBJECTIVE:  Vitals:    07/12/22 1031   BP: 118/78   Resp: 16   Weight: 174 lb (78.9 kg)   Height: 5' 3\" (1.6 m)        Physical Exam  Constitutional:       Appearance: Normal appearance. HENT:      Head: Normocephalic. Neck:      Vascular: No carotid bruit. Cardiovascular:      Rate and Rhythm: Normal rate and regular rhythm. Heart sounds: Normal heart sounds. Pulmonary:      Effort: Pulmonary effort is normal.      Breath sounds: Normal breath sounds. Musculoskeletal:      Right lower leg: No edema. Left lower leg: No edema. Neurological:      Mental Status: She is alert and oriented to person, place, and time. Psychiatric:         Mood and Affect: Mood normal.          Medical problems and test results were reviewed with the patient today. No results found for this or any previous visit (from the past 672 hour(s)). ASSESSMENT and PLAN    Annamarie Lea was seen today for follow-up, medication refill and nose problem. Diagnoses and all orders for this visit:    Attention deficit hyperactivity disorder (ADHD), combined type  -     methylphenidate (CONCERTA) 36 MG extended release tablet; Take 1 tablet by mouth every morning for 30 days. -     methylphenidate (CONCERTA) 36 MG extended release tablet; Take 1 tablet by mouth daily for 30 days.   -     methylphenidate (CONCERTA) 36 MG extended release tablet; Take 1 tablet by mouth daily for 30 days. Screening cholesterol level  -     Lipid Panel; Future    Screening for diabetes mellitus  -     Comprehensive Metabolic Panel; Future    Premenopausal patient  -     CBC with Auto Differential; Future    Need for hepatitis C screening test  -     Hepatitis C Antibody; Future    Nasal polyp  -     [facility] - Giles Donohue MD, Otolaryngology, [place]      3 months of concerning given. UDS was negative. Labs ordered. She will come back for Pap and labs in a few weeks. Referral sent to ENT for nasal polyp    No follow-up provider specified.

## 2022-08-18 ENCOUNTER — HOSPITAL ENCOUNTER (OUTPATIENT)
Dept: MAMMOGRAPHY | Age: 37
Discharge: HOME | End: 2022-08-21
Payer: OTHER GOVERNMENT

## 2022-08-18 DIAGNOSIS — Z12.31 OTHER SCREENING MAMMOGRAM: ICD-10-CM

## 2022-08-18 PROCEDURE — 77067 SCR MAMMO BI INCL CAD: CPT

## 2022-08-23 ENCOUNTER — TELEPHONE (OUTPATIENT)
Dept: FAMILY MEDICINE CLINIC | Facility: CLINIC | Age: 37
End: 2022-08-23

## 2022-08-23 NOTE — TELEPHONE ENCOUNTER
Patient requesting refill on methylphenidate (CONCERTA) 36 MG extended release tablet and patient also advised she needed a refill on her other medication which I did not see on list. Jaiden Molina.

## 2022-11-10 ENCOUNTER — TELEMEDICINE (OUTPATIENT)
Dept: FAMILY MEDICINE CLINIC | Facility: CLINIC | Age: 37
End: 2022-11-10
Payer: COMMERCIAL

## 2022-11-10 DIAGNOSIS — M62.838 MUSCLE SPASMS OF NECK: ICD-10-CM

## 2022-11-10 DIAGNOSIS — F90.2 ATTENTION DEFICIT HYPERACTIVITY DISORDER (ADHD), COMBINED TYPE: Primary | ICD-10-CM

## 2022-11-10 PROCEDURE — 99214 OFFICE O/P EST MOD 30 MIN: CPT | Performed by: FAMILY MEDICINE

## 2022-11-10 RX ORDER — METHYLPHENIDATE HYDROCHLORIDE 36 MG/1
36 TABLET ORAL DAILY
Qty: 30 TABLET | Refills: 0 | Status: SHIPPED | OUTPATIENT
Start: 2022-12-10 | End: 2023-01-09

## 2022-11-10 RX ORDER — METHOCARBAMOL 500 MG/1
500 TABLET, FILM COATED ORAL DAILY
Qty: 90 TABLET | Refills: 1 | Status: SHIPPED | OUTPATIENT
Start: 2022-11-10

## 2022-11-10 RX ORDER — METHYLPHENIDATE HYDROCHLORIDE 36 MG/1
36 TABLET ORAL DAILY
Qty: 30 TABLET | Refills: 0 | Status: SHIPPED | OUTPATIENT
Start: 2023-01-10 | End: 2023-02-09

## 2022-11-10 RX ORDER — METHOCARBAMOL 500 MG/1
TABLET, FILM COATED ORAL
COMMUNITY
Start: 2022-09-10 | End: 2022-11-10 | Stop reason: SDUPTHER

## 2022-11-10 RX ORDER — METHYLPHENIDATE HYDROCHLORIDE 36 MG/1
36 TABLET ORAL EVERY MORNING
Qty: 30 TABLET | Refills: 0 | Status: SHIPPED | OUTPATIENT
Start: 2022-11-10 | End: 2022-12-10

## 2022-11-10 ASSESSMENT — PATIENT HEALTH QUESTIONNAIRE - PHQ9
SUM OF ALL RESPONSES TO PHQ QUESTIONS 1-9: 1
SUM OF ALL RESPONSES TO PHQ QUESTIONS 1-9: 1
SUM OF ALL RESPONSES TO PHQ9 QUESTIONS 1 & 2: 1
SUM OF ALL RESPONSES TO PHQ QUESTIONS 1-9: 1
2. FEELING DOWN, DEPRESSED OR HOPELESS: 1
SUM OF ALL RESPONSES TO PHQ QUESTIONS 1-9: 1
1. LITTLE INTEREST OR PLEASURE IN DOING THINGS: 0

## 2022-11-10 ASSESSMENT — ENCOUNTER SYMPTOMS
NAUSEA: 0
CONSTIPATION: 0
ABDOMINAL PAIN: 0
DIARRHEA: 0
COUGH: 0
VOMITING: 0
SHORTNESS OF BREATH: 0

## 2022-11-10 NOTE — PROGRESS NOTES
Johanne Bryson (:  1985) is a Established patient, here for evaluation of the following:    Assessment & Plan   Below is the assessment and plan developed based on review of pertinent history, physical exam, labs, studies, and medications. 1. Attention deficit hyperactivity disorder (ADHD), combined type  -     methylphenidate (CONCERTA) 36 MG extended release tablet; Take 1 tablet by mouth every morning for 30 days. , Disp-30 tablet, R-0Normal  -     methylphenidate (CONCERTA) 36 MG extended release tablet; Take 1 tablet by mouth daily for 30 days. , Disp-30 tablet, R-0Normal  -     methylphenidate (CONCERTA) 36 MG extended release tablet; Take 1 tablet by mouth daily for 30 days. , Disp-30 tablet, R-0Normal  2. Muscle spasms of neck  -     methocarbamol (ROBAXIN) 500 MG tablet; Take 1 tablet by mouth daily, Disp-90 tablet, R-1Normal    No follow-ups on file. Subjective   16-year-old female here for follow-up of ADHD. She is on Concerta 36 mg daily. It works very well. She has no side effects. No palpitations or insomnia. She would like a refill of her Robaxin. She uses it occasionally for muscle spasms in her neck. No other concerns. Review of Systems   Constitutional:  Negative for chills, fatigue and fever. Respiratory:  Negative for cough and shortness of breath. Cardiovascular:  Negative for chest pain and palpitations. Gastrointestinal:  Negative for abdominal pain, constipation, diarrhea, nausea and vomiting. Neurological:  Negative for dizziness and headaches. Psychiatric/Behavioral:  Negative for sleep disturbance. The patient is not nervous/anxious. Objective   Patient-Reported Vitals  BP Observations: No, remote/electronic monitoring device was not used or able to be verified       Physical Exam  Constitutional:       Appearance: Normal appearance. HENT:      Head: Normocephalic.    Neurological:      Mental Status: She is alert and oriented to person, place, and time. Psychiatric:         Mood and Affect: Mood normal.   [INSTRUCTIONS:  \"[x]\" Indicates a positive item  \"[]\" Indicates a negative item  -- DELETE ALL ITEMS NOT EXAMINED]    Constitutional: [x] Appears well-developed and well-nourished [x] No apparent distress      [] Abnormal -     Mental status: [x] Alert and awake  [x] Oriented to person/place/time [x] Able to follow commands    [] Abnormal -     Eyes:   EOM    [x]  Normal    [] Abnormal -   Sclera  [x]  Normal    [] Abnormal -          Discharge [x]  None visible   [] Abnormal -     HENT: [x] Normocephalic, atraumatic  [] Abnormal -   [x] Mouth/Throat: Mucous membranes are moist    External Ears [x] Normal  [] Abnormal -    Neck: [x] No visualized mass [] Abnormal -     Pulmonary/Chest: [x] Respiratory effort normal   [x] No visualized signs of difficulty breathing or respiratory distress        [] Abnormal -      Musculoskeletal:   [x] Normal gait with no signs of ataxia         [x] Normal range of motion of neck        [] Abnormal -     Neurological:        [x] No Facial Asymmetry (Cranial nerve 7 motor function) (limited exam due to video visit)          [x] No gaze palsy        [] Abnormal -          Skin:        [x] No significant exanthematous lesions or discoloration noted on facial skin         [] Abnormal -            Psychiatric:       [x] Normal Affect [] Abnormal -        [x] No Hallucinations    Other pertinent observable physical exam findings:-         On this date 11/10/2022 I have spent 11 minutes reviewing previous notes, test results and face to face (virtual) with the patient discussing the diagnosis and importance of compliance with the treatment plan as well as documenting on the day of the visit. Guille Bryson, was evaluated through a synchronous (real-time) audio-video encounter. The patient (or guardian if applicable) is aware that this is a billable service, which includes applicable co-pays.  This Virtual Visit was conducted with patient's (and/or legal guardian's) consent. The visit was conducted pursuant to the emergency declaration under the [address] [place] [address] and the [employer] Act. Patient identification was verified, and a caregiver was present when appropriate. The patient was located at Other: work . Provider was located at [facility] (Appt Dept): [address] ,  [address] [place] [address].         --Lulú Merino, DO

## 2023-09-20 ENCOUNTER — TRANSCRIBE ORDERS (OUTPATIENT)
Dept: SCHEDULING | Age: 38
End: 2023-09-20

## 2023-09-20 DIAGNOSIS — Z12.31 VISIT FOR SCREENING MAMMOGRAM: Primary | ICD-10-CM

## 2023-09-22 ENCOUNTER — HOSPITAL ENCOUNTER (OUTPATIENT)
Dept: MAMMOGRAPHY | Age: 38
Discharge: HOME | End: 2023-09-22
Attending: FAMILY MEDICINE
Payer: COMMERCIAL

## 2023-09-22 VITALS — BODY MASS INDEX: 33.66 KG/M2 | WEIGHT: 190 LBS

## 2023-09-22 DIAGNOSIS — Z12.31 VISIT FOR SCREENING MAMMOGRAM: ICD-10-CM

## 2023-09-22 PROCEDURE — 77067 SCR MAMMO BI INCL CAD: CPT

## 2023-09-25 SDOH — ECONOMIC STABILITY: FOOD INSECURITY: WITHIN THE PAST 12 MONTHS, THE FOOD YOU BOUGHT JUST DIDN'T LAST AND YOU DIDN'T HAVE MONEY TO GET MORE.: PATIENT DECLINED

## 2023-09-25 SDOH — ECONOMIC STABILITY: HOUSING INSECURITY
IN THE LAST 12 MONTHS, WAS THERE A TIME WHEN YOU DID NOT HAVE A STEADY PLACE TO SLEEP OR SLEPT IN A SHELTER (INCLUDING NOW)?: PATIENT REFUSED

## 2023-09-25 SDOH — ECONOMIC STABILITY: FOOD INSECURITY: WITHIN THE PAST 12 MONTHS, YOU WORRIED THAT YOUR FOOD WOULD RUN OUT BEFORE YOU GOT MONEY TO BUY MORE.: PATIENT DECLINED

## 2023-09-25 SDOH — ECONOMIC STABILITY: INCOME INSECURITY: HOW HARD IS IT FOR YOU TO PAY FOR THE VERY BASICS LIKE FOOD, HOUSING, MEDICAL CARE, AND HEATING?: PATIENT DECLINED

## 2023-09-25 SDOH — ECONOMIC STABILITY: TRANSPORTATION INSECURITY
IN THE PAST 12 MONTHS, HAS LACK OF TRANSPORTATION KEPT YOU FROM MEETINGS, WORK, OR FROM GETTING THINGS NEEDED FOR DAILY LIVING?: PATIENT DECLINED

## 2023-09-25 ASSESSMENT — PATIENT HEALTH QUESTIONNAIRE - PHQ9
SUM OF ALL RESPONSES TO PHQ9 QUESTIONS 1 & 2: 0
1. LITTLE INTEREST OR PLEASURE IN DOING THINGS: NOT AT ALL
SUM OF ALL RESPONSES TO PHQ QUESTIONS 1-9: 0
SUM OF ALL RESPONSES TO PHQ QUESTIONS 1-9: 0
2. FEELING DOWN, DEPRESSED OR HOPELESS: NOT AT ALL
SUM OF ALL RESPONSES TO PHQ QUESTIONS 1-9: 0
SUM OF ALL RESPONSES TO PHQ QUESTIONS 1-9: 0
2. FEELING DOWN, DEPRESSED OR HOPELESS: 0
1. LITTLE INTEREST OR PLEASURE IN DOING THINGS: 0
SUM OF ALL RESPONSES TO PHQ9 QUESTIONS 1 & 2: 0

## 2023-09-26 ENCOUNTER — OFFICE VISIT (OUTPATIENT)
Dept: FAMILY MEDICINE CLINIC | Facility: CLINIC | Age: 38
End: 2023-09-26
Payer: COMMERCIAL

## 2023-09-26 VITALS
HEIGHT: 63 IN | RESPIRATION RATE: 16 BRPM | WEIGHT: 189 LBS | BODY MASS INDEX: 33.49 KG/M2 | DIASTOLIC BLOOD PRESSURE: 78 MMHG | SYSTOLIC BLOOD PRESSURE: 116 MMHG

## 2023-09-26 DIAGNOSIS — Z80.3 FAMILY HISTORY OF BREAST CANCER IN FIRST DEGREE RELATIVE: ICD-10-CM

## 2023-09-26 DIAGNOSIS — Z13.220 SCREENING CHOLESTEROL LEVEL: ICD-10-CM

## 2023-09-26 DIAGNOSIS — Z23 NEED FOR INFLUENZA VACCINATION: ICD-10-CM

## 2023-09-26 DIAGNOSIS — F90.2 ATTENTION DEFICIT HYPERACTIVITY DISORDER (ADHD), COMBINED TYPE: Primary | ICD-10-CM

## 2023-09-26 DIAGNOSIS — Z11.4 ENCOUNTER FOR SCREENING FOR HIV: ICD-10-CM

## 2023-09-26 DIAGNOSIS — Z13.1 DIABETES MELLITUS SCREENING: ICD-10-CM

## 2023-09-26 DIAGNOSIS — Z11.59 NEED FOR HEPATITIS C SCREENING TEST: ICD-10-CM

## 2023-09-26 DIAGNOSIS — Z79.899 ENCOUNTER FOR LONG-TERM (CURRENT) USE OF MEDICATIONS: ICD-10-CM

## 2023-09-26 DIAGNOSIS — N95.9 PREMENOPAUSAL PATIENT: ICD-10-CM

## 2023-09-26 DIAGNOSIS — Z51.81 ENCOUNTER FOR THERAPEUTIC DRUG MONITORING: ICD-10-CM

## 2023-09-26 LAB
11-NOR-9-THC-9-COOH, POC: NEGATIVE
ALBUMIN SERPL-MCNC: 3.8 G/DL (ref 3.5–5)
ALBUMIN/GLOB SERPL: 1 (ref 0.4–1.6)
ALP SERPL-CCNC: 104 U/L (ref 50–136)
ALT SERPL-CCNC: 96 U/L (ref 12–65)
AMPHETAMINE, URINE, POC: NEGATIVE
ANION GAP SERPL CALC-SCNC: 5 MMOL/L (ref 2–11)
AST SERPL-CCNC: 46 U/L (ref 15–37)
BASOPHILS # BLD: 0 K/UL (ref 0–0.2)
BASOPHILS NFR BLD: 0 % (ref 0–2)
BENZODIAZEPINES, URINE, POC: NEGATIVE
BENZOYLECGONINE, URINE, POC: NEGATIVE
BILIRUB SERPL-MCNC: 0.6 MG/DL (ref 0.2–1.1)
BUN SERPL-MCNC: 9 MG/DL (ref 6–23)
CALCIUM SERPL-MCNC: 9.7 MG/DL (ref 8.3–10.4)
CHLORIDE SERPL-SCNC: 108 MMOL/L (ref 101–110)
CHOLEST SERPL-MCNC: 223 MG/DL
CO2 SERPL-SCNC: 27 MMOL/L (ref 21–32)
CREAT SERPL-MCNC: 0.7 MG/DL (ref 0.6–1)
DIFFERENTIAL METHOD BLD: NORMAL
EOSINOPHIL # BLD: 0.2 K/UL (ref 0–0.8)
EOSINOPHIL NFR BLD: 3 % (ref 0.5–7.8)
ERYTHROCYTE [DISTWIDTH] IN BLOOD BY AUTOMATED COUNT: 13.1 % (ref 11.9–14.6)
GLOBULIN SER CALC-MCNC: 4 G/DL (ref 2.8–4.5)
GLUCOSE SERPL-MCNC: 113 MG/DL (ref 65–100)
HCT VFR BLD AUTO: 42 % (ref 35.8–46.3)
HCV AB SER QL: NONREACTIVE
HDLC SERPL-MCNC: 44 MG/DL (ref 40–60)
HDLC SERPL: 5.1
HGB BLD-MCNC: 13.7 G/DL (ref 11.7–15.4)
HIV 1+2 AB+HIV1 P24 AG SERPL QL IA: NONREACTIVE
HIV 1/2 RESULT COMMENT: NORMAL
IMM GRANULOCYTES # BLD AUTO: 0 K/UL (ref 0–0.5)
IMM GRANULOCYTES NFR BLD AUTO: 0 % (ref 0–5)
LDLC SERPL CALC-MCNC: 144.4 MG/DL
LYMPHOCYTES # BLD: 1.6 K/UL (ref 0.5–4.6)
LYMPHOCYTES NFR BLD: 23 % (ref 13–44)
MCH RBC QN AUTO: 28.4 PG (ref 26.1–32.9)
MCHC RBC AUTO-ENTMCNC: 32.6 G/DL (ref 31.4–35)
MCV RBC AUTO: 87.1 FL (ref 82–102)
METHADONE, URINE, POC: NEGATIVE
METHAMPHETAMINE, URINE, POC: NEGATIVE
MONOCYTES # BLD: 0.4 K/UL (ref 0.1–1.3)
MONOCYTES NFR BLD: 6 % (ref 4–12)
MORPHINE, URINE, POC: NEGATIVE
NEUTS SEG # BLD: 4.7 K/UL (ref 1.7–8.2)
NEUTS SEG NFR BLD: 68 % (ref 43–78)
NRBC # BLD: 0 K/UL (ref 0–0.2)
PHENCYCLIDINE, URINE, POC: NEGATIVE
PLATELET # BLD AUTO: 268 K/UL (ref 150–450)
PMV BLD AUTO: 10.6 FL (ref 9.4–12.3)
POTASSIUM SERPL-SCNC: 4.4 MMOL/L (ref 3.5–5.1)
PROT SERPL-MCNC: 7.8 G/DL (ref 6.3–8.2)
RBC # BLD AUTO: 4.82 M/UL (ref 4.05–5.2)
SODIUM SERPL-SCNC: 140 MMOL/L (ref 133–143)
TRIGL SERPL-MCNC: 173 MG/DL (ref 35–150)
URINALYSIS COLOR, POC: YELLOW
VLDLC SERPL CALC-MCNC: 34.6 MG/DL (ref 6–23)
WBC # BLD AUTO: 6.9 K/UL (ref 4.3–11.1)

## 2023-09-26 PROCEDURE — 99214 OFFICE O/P EST MOD 30 MIN: CPT | Performed by: FAMILY MEDICINE

## 2023-09-26 PROCEDURE — 90674 CCIIV4 VAC NO PRSV 0.5 ML IM: CPT | Performed by: FAMILY MEDICINE

## 2023-09-26 PROCEDURE — 80305 DRUG TEST PRSMV DIR OPT OBS: CPT | Performed by: FAMILY MEDICINE

## 2023-09-26 PROCEDURE — 90471 IMMUNIZATION ADMIN: CPT | Performed by: FAMILY MEDICINE

## 2023-09-26 RX ORDER — METHYLPHENIDATE HYDROCHLORIDE 36 MG/1
36 TABLET ORAL DAILY
Qty: 30 TABLET | Refills: 0 | Status: SHIPPED | OUTPATIENT
Start: 2023-11-25 | End: 2023-12-25

## 2023-09-26 RX ORDER — METHYLPHENIDATE HYDROCHLORIDE 36 MG/1
36 TABLET ORAL DAILY
Qty: 30 TABLET | Refills: 0 | Status: SHIPPED | OUTPATIENT
Start: 2023-10-26 | End: 2023-11-25

## 2023-09-26 RX ORDER — METHYLPHENIDATE HYDROCHLORIDE 36 MG/1
36 TABLET ORAL EVERY MORNING
Qty: 30 TABLET | Refills: 0 | Status: SHIPPED | OUTPATIENT
Start: 2023-09-26 | End: 2023-10-26

## 2023-09-26 NOTE — PROGRESS NOTES
options     delivery delivered     Past Medical History:   Diagnosis Date    ADHD     Diabetes mellitus ([address]) 2014    gestational on glyburide    Frequent UTI      Past Surgical History:   Procedure Laterality Date     SECTION      x2     SECTION      ,,    CHOLECYSTECTOMY      CHOLECYSTECTOMY, LAPAROSCOPIC      OTHER SURGICAL HISTORY      lapo and tummy tuck    WISDOM TOOTH EXTRACTION      WISDOM TOOTH EXTRACTION      all 4     Social History     Tobacco Use    Smoking status: Never     Passive exposure: Never    Smokeless tobacco: Never   Substance Use Topics    Alcohol use: No         Review of Systems   All other systems reviewed and are negative. OBJECTIVE:  Vitals:    23 1004   BP: 116/78   Resp: 16   Weight: 189 lb (85.7 kg)   Height: 5' 3\" (1.6 m)        Physical Exam  Constitutional:       Appearance: Normal appearance. She is normal weight. HENT:      Head: Normocephalic. Right Ear: Tympanic membrane, ear canal and external ear normal.      Left Ear: Tympanic membrane, ear canal and external ear normal.      Mouth/Throat:      Mouth: Mucous membranes are moist.      Pharynx: Oropharynx is clear. Eyes:      Extraocular Movements: Extraocular movements intact. Conjunctiva/sclera: Conjunctivae normal.      Pupils: Pupils are equal, round, and reactive to light. Cardiovascular:      Rate and Rhythm: Normal rate and regular rhythm. Pulses: Normal pulses. Heart sounds: Normal heart sounds. Pulmonary:      Effort: Pulmonary effort is normal.      Breath sounds: Normal breath sounds. Abdominal:      General: Bowel sounds are normal.      Palpations: Abdomen is soft. Musculoskeletal:         General: Normal range of motion. Cervical back: Normal range of motion. Skin:     General: Skin is warm and dry. Neurological:      General: No focal deficit present.       Mental Status: She is alert and oriented to person, place, and

## 2023-10-04 DIAGNOSIS — F90.2 ATTENTION DEFICIT HYPERACTIVITY DISORDER (ADHD), COMBINED TYPE: Primary | ICD-10-CM

## 2023-10-04 RX ORDER — METHYLPHENIDATE HYDROCHLORIDE 10 MG/1
10 TABLET ORAL 2 TIMES DAILY
Qty: 60 TABLET | Refills: 0 | Status: SHIPPED | OUTPATIENT
Start: 2023-10-04 | End: 2023-11-03

## 2023-10-09 DIAGNOSIS — R74.8 ELEVATED LIVER ENZYMES: ICD-10-CM

## 2023-10-09 DIAGNOSIS — E78.2 MIXED HYPERLIPIDEMIA: Primary | ICD-10-CM

## 2023-11-09 ENCOUNTER — TELEPHONE (OUTPATIENT)
Dept: FAMILY MEDICINE CLINIC | Facility: CLINIC | Age: 38
End: 2023-11-09

## 2023-11-09 NOTE — TELEPHONE ENCOUNTER
Patient requesting refill on methylphenidate (RITALIN) 10 MG tablet to [facility]

## 2023-11-10 DIAGNOSIS — F90.2 ATTENTION DEFICIT HYPERACTIVITY DISORDER (ADHD), COMBINED TYPE: ICD-10-CM

## 2023-11-10 RX ORDER — METHYLPHENIDATE HYDROCHLORIDE 10 MG/1
10 TABLET ORAL 2 TIMES DAILY
Qty: 60 TABLET | Refills: 0 | Status: SHIPPED | OUTPATIENT
Start: 2023-11-10 | End: 2023-12-10

## 2023-11-22 ENCOUNTER — TELEMEDICINE (OUTPATIENT)
Dept: FAMILY MEDICINE CLINIC | Facility: CLINIC | Age: 38
End: 2023-11-22
Payer: COMMERCIAL

## 2023-11-22 DIAGNOSIS — M54.31 SCIATICA OF RIGHT SIDE: ICD-10-CM

## 2023-11-22 DIAGNOSIS — M25.551 RIGHT HIP PAIN: Primary | ICD-10-CM

## 2023-11-22 PROCEDURE — 99214 OFFICE O/P EST MOD 30 MIN: CPT | Performed by: FAMILY MEDICINE

## 2023-11-22 RX ORDER — PREDNISONE 20 MG/1
20 TABLET ORAL DAILY
Qty: 7 TABLET | Refills: 0 | Status: SHIPPED | OUTPATIENT
Start: 2023-11-22 | End: 2023-11-29

## 2023-11-22 ASSESSMENT — ENCOUNTER SYMPTOMS
NAUSEA: 0
ABDOMINAL PAIN: 0
SHORTNESS OF BREATH: 0
VOMITING: 0
CONSTIPATION: 0
DIARRHEA: 0
COUGH: 0
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
## 2023-11-25 ENCOUNTER — HOSPITAL ENCOUNTER (OUTPATIENT)
Dept: GENERAL RADIOLOGY | Age: 38
End: 2023-11-25
Payer: COMMERCIAL

## 2023-11-25 DIAGNOSIS — M54.31 SCIATICA OF RIGHT SIDE: ICD-10-CM

## 2023-11-25 DIAGNOSIS — M25.551 RIGHT HIP PAIN: ICD-10-CM

## 2023-11-25 PROCEDURE — 73502 X-RAY EXAM HIP UNI 2-3 VIEWS: CPT

## 2023-12-20 DIAGNOSIS — Z01.419 ENCOUNTER FOR GYNECOLOGICAL EXAMINATION WITHOUT ABNORMAL FINDING: ICD-10-CM

## 2023-12-29 LAB
COLLECTION METHOD: NORMAL
CYTOLOGIST CVX/VAG CYTO: NORMAL
CYTOLOGY CVX/VAG DOC THIN PREP: NORMAL
DATE OF LMP: NORMAL
HPV APTIMA: NEGATIVE
HPV GENOTYPE REFLEX: NORMAL
Lab: NORMAL
OTHER PT INFO: NORMAL
PAP SOURCE: NORMAL
PATH REPORT.FINAL DX SPEC: NORMAL
PREV CYTO INFO: NORMAL
PREV TREATMENT RESULTS: NORMAL
PREV TREATMENT: NORMAL
STAT OF ADQ CVX/VAG CYTO-IMP: NORMAL

## 2024-01-23 DIAGNOSIS — F90.2 ATTENTION DEFICIT HYPERACTIVITY DISORDER (ADHD), COMBINED TYPE: ICD-10-CM

## 2024-01-25 RX ORDER — METHYLPHENIDATE HYDROCHLORIDE 10 MG/1
10 TABLET ORAL 2 TIMES DAILY
Qty: 60 TABLET | Refills: 0 | OUTPATIENT
Start: 2024-01-25 | End: 2024-02-24

## 2024-02-23 DIAGNOSIS — F90.2 ATTENTION DEFICIT HYPERACTIVITY DISORDER (ADHD), COMBINED TYPE: ICD-10-CM

## 2024-03-01 RX ORDER — METHYLPHENIDATE HYDROCHLORIDE 10 MG/1
10 TABLET ORAL 2 TIMES DAILY
Qty: 60 TABLET | Refills: 0 | Status: SHIPPED | OUTPATIENT
Start: 2024-03-01 | End: 2024-03-31

## 2025-01-03 ENCOUNTER — OFFICE VISIT (OUTPATIENT)
Dept: FAMILY MEDICINE CLINIC | Facility: CLINIC | Age: 40
End: 2025-01-03

## 2025-01-03 VITALS
BODY MASS INDEX: 32.78 KG/M2 | SYSTOLIC BLOOD PRESSURE: 118 MMHG | RESPIRATION RATE: 16 BRPM | HEIGHT: 63 IN | DIASTOLIC BLOOD PRESSURE: 74 MMHG | WEIGHT: 185 LBS

## 2025-01-03 DIAGNOSIS — E11.9 TYPE 2 DIABETES MELLITUS WITHOUT COMPLICATION, WITHOUT LONG-TERM CURRENT USE OF INSULIN (HCC): Primary | ICD-10-CM

## 2025-01-03 DIAGNOSIS — B07.8 OTHER VIRAL WARTS: ICD-10-CM

## 2025-01-03 DIAGNOSIS — Z12.31 ENCOUNTER FOR SCREENING MAMMOGRAM FOR MALIGNANT NEOPLASM OF BREAST: ICD-10-CM

## 2025-01-03 LAB
ALBUMIN SERPL-MCNC: 3.6 G/DL (ref 3.5–5)
ALBUMIN/GLOB SERPL: 0.9 (ref 1–1.9)
ALP SERPL-CCNC: 95 U/L (ref 35–104)
ALT SERPL-CCNC: 42 U/L (ref 8–45)
ANION GAP SERPL CALC-SCNC: 13 MMOL/L (ref 7–16)
AST SERPL-CCNC: 34 U/L (ref 15–37)
BILIRUB SERPL-MCNC: 0.3 MG/DL (ref 0–1.2)
BUN SERPL-MCNC: 13 MG/DL (ref 6–23)
CALCIUM SERPL-MCNC: 9.7 MG/DL (ref 8.8–10.2)
CHLORIDE SERPL-SCNC: 101 MMOL/L (ref 98–107)
CHOLEST SERPL-MCNC: 292 MG/DL (ref 0–200)
CO2 SERPL-SCNC: 24 MMOL/L (ref 20–29)
CREAT SERPL-MCNC: 0.65 MG/DL (ref 0.6–1.1)
GLOBULIN SER CALC-MCNC: 3.9 G/DL (ref 2.3–3.5)
GLUCOSE SERPL-MCNC: 101 MG/DL (ref 70–99)
HBA1C MFR BLD: 5.2 %
HDLC SERPL-MCNC: 46 MG/DL (ref 40–60)
HDLC SERPL: 6.3 (ref 0–5)
LDLC SERPL CALC-MCNC: 167 MG/DL (ref 0–100)
POTASSIUM SERPL-SCNC: 4.8 MMOL/L (ref 3.5–5.1)
PROT SERPL-MCNC: 7.5 G/DL (ref 6.3–8.2)
SODIUM SERPL-SCNC: 138 MMOL/L (ref 136–145)
TRIGL SERPL-MCNC: 395 MG/DL (ref 0–150)
VLDLC SERPL CALC-MCNC: 79 MG/DL (ref 6–23)

## 2025-01-03 RX ORDER — METFORMIN HYDROCHLORIDE 500 MG/1
1000 TABLET, EXTENDED RELEASE ORAL 2 TIMES DAILY
COMMUNITY
Start: 2024-11-25 | End: 2025-11-25

## 2025-01-03 SDOH — ECONOMIC STABILITY: FOOD INSECURITY: WITHIN THE PAST 12 MONTHS, YOU WORRIED THAT YOUR FOOD WOULD RUN OUT BEFORE YOU GOT MONEY TO BUY MORE.: NEVER TRUE

## 2025-01-03 SDOH — ECONOMIC STABILITY: INCOME INSECURITY: HOW HARD IS IT FOR YOU TO PAY FOR THE VERY BASICS LIKE FOOD, HOUSING, MEDICAL CARE, AND HEATING?: NOT HARD AT ALL

## 2025-01-03 SDOH — ECONOMIC STABILITY: FOOD INSECURITY: WITHIN THE PAST 12 MONTHS, THE FOOD YOU BOUGHT JUST DIDN'T LAST AND YOU DIDN'T HAVE MONEY TO GET MORE.: NEVER TRUE

## 2025-01-03 ASSESSMENT — PATIENT HEALTH QUESTIONNAIRE - PHQ9
1. LITTLE INTEREST OR PLEASURE IN DOING THINGS: NOT AT ALL
SUM OF ALL RESPONSES TO PHQ QUESTIONS 1-9: 0
2. FEELING DOWN, DEPRESSED OR HOPELESS: NOT AT ALL
SUM OF ALL RESPONSES TO PHQ QUESTIONS 1-9: 0
SUM OF ALL RESPONSES TO PHQ9 QUESTIONS 1 & 2: 0

## 2025-01-03 ASSESSMENT — ENCOUNTER SYMPTOMS
ROS SKIN COMMENTS: WART
VOMITING: 0
CONSTIPATION: 0
COUGH: 0
NAUSEA: 0
ABDOMINAL PAIN: 0
DIARRHEA: 0
SHORTNESS OF BREATH: 0

## 2025-01-03 NOTE — PROGRESS NOTES
Constitutional:  Negative for chills, fatigue and fever.   Respiratory:  Negative for cough and shortness of breath.    Cardiovascular:  Negative for chest pain and palpitations.   Gastrointestinal:  Negative for abdominal pain, constipation, diarrhea, nausea and vomiting.   Skin:         wart   Neurological:  Negative for dizziness and headaches.   Psychiatric/Behavioral:  Negative for sleep disturbance. The patient is not nervous/anxious.           OBJECTIVE:  Vitals:    01/03/25 1152   BP: 118/74   Resp: 16   Weight: 83.9 kg (185 lb)   Height: 1.6 m (5' 3\")        Physical Exam  Constitutional:       Appearance: Normal appearance.   HENT:      Head: Normocephalic.   Neck:      Vascular: No carotid bruit.   Cardiovascular:      Rate and Rhythm: Normal rate and regular rhythm.      Pulses: Normal pulses.      Heart sounds: Normal heart sounds.   Pulmonary:      Effort: Pulmonary effort is normal.      Breath sounds: Normal breath sounds.   Neurological:      Mental Status: She is alert and oriented to person, place, and time.   Psychiatric:         Mood and Affect: Mood normal.          Medical problems and test results were reviewed with the patient today.     No results found for this or any previous visit (from the past 672 hour(s)).      ASSESSMENT and PLAN    Amina was seen today for annual exam.    Diagnoses and all orders for this visit:    Type 2 diabetes mellitus without complication, without long-term current use of insulin (HCC)  -     Lipid Panel; Future  -     Comprehensive Metabolic Panel; Future  -     Comprehensive Metabolic Panel  -     Lipid Panel    Encounter for screening mammogram for malignant neoplasm of breast  -     MRI BREAST BILATERAL W WO CONTRAST; Future    Other viral warts  -     Wart destruction    Cryotherapy with warts on foot. Destroyed 3 warts.   A1C was 5.3. decrease Metformin to 1000 mg daily.   Check labs.       No follow-up provider specified.

## 2025-03-03 RX ORDER — METFORMIN HYDROCHLORIDE 500 MG/1
1000 TABLET, EXTENDED RELEASE ORAL 2 TIMES DAILY
Qty: 360 TABLET | Refills: 1 | Status: SHIPPED | OUTPATIENT
Start: 2025-03-03 | End: 2025-08-30

## 2025-06-26 NOTE — THERAPY EVALUATION
Larissa Sanches  : 1985  [facility]  Secondary:  [address] at [facility]  [address], [facility], [address]  [phone]   [phone]          OUTPATIENT PHYSICAL THERAPY:Initial Assessment 2020   ICD-10: Treatment Diagnosis: M54.2  Precautions/Allergies:   Patient has no known allergies. TREATMENT PLAN:  Effective Dates: 2020 TO 2020 (90 days). Frequency/Duration: 2 times a week for 90 Day(s) MEDICAL/REFERRING DIAGNOSIS:  Neck pain [M54.2]   DATE OF ONSET: 2020  REFERRING PHYSICIAN: Brenda Deng DO MD Orders: Lan Rivera and treat  Return MD Appointment: N/A     INITIAL ASSESSMENT:  Ms. Elier Arriaza presents to physical therapy with neck pain starting in May 2020. Pt reports that she had a similar bout of neck pain in 2019 that was treated with PT and mostly resolved. In May of this year she was pulled and fell down, reinjuring her neck. Since this injury she has had constant pain in the back of her neck with pain that will radiate out to the tip of her R shoulder. Pt also reports having pain down her thoracic spine between her shoulder blades, which starts later during the day or with lifting. Pt reports having headaches 1-3x/wk, located on the top of her head with associated numbness/tingling in the back of her head. She states these HAs will also cause some blurry vision and sensitivity to light. They last several hours and go away with sleep. Pt has no specific easing factors other than sleep/rest or not moving her head. Agg factors include: lifting, driving, turning head, reading. Pt does no report any sym's on her L side and that they have always been on the R side of her neck, shoulder and head. . Pt was receiving manipulations from her doctor every 8-10wk which have helped, however due to [place] has no been able to. PROBLEM LIST (Impacting functional limitations):  1.  Decreased Strength  2. Decreased ADL/Functional Activities  3. Increased Pain  4. Decreased Activity Tolerance  5. Decreased Flexibility/Joint Mobility INTERVENTIONS PLANNED: (Treatment may consist of any combination of the following)  1. Home Exercise Program (HEP)  2. Manual Therapy  3. Neuromuscular Re-education/Strengthening  4. Range of Motion (ROM)  5. Therapeutic Activites  6. Therapeutic Exercise/Strengthening     GOALS: (Goals have been discussed and agreed upon with patient.)  Short-Term Functional Goals: Time Frame: 1 week  1. Pt will be compliant with progressive HEP  Discharge Goals: Time Frame: 10 weeks  1. Pt will decrease score on her NDI by at least 10%  2. Pt will have R/L cervical rotation of > 75deg  3. Pt will have DNF endurance hold of at least 20sec    OUTCOME MEASURE:   Tool Used: Neck Disability Index (NDI)  Score:  Initial: 18/50  Most Recent: X/50 (Date: -- )   Interpretation of Score: The Neck Disability Index is a revised form of the Oswestry Low Back Pain Index and is designed to measure the activities of daily living in person's with neck pain. Each section is scored on a 0-5 scale, 5 representing the greatest disability. The scores of each section are added together for a total score of 50. MEDICAL NECESSITY:   · Patient is expected to demonstrate progress in strength, range of motion, coordination and functional technique to reduce pain and return patient to prior level of function. REASON FOR SERVICES/OTHER COMMENTS:  · Patient requires skilled therapy in order to reduce pain and return her to her prior level of function before injury, including all work tasks and homemaking tasks. · Patient has been observed to have an improvement in sym's before, during or after an intervention.   Total Duration:       Rehabilitation Potential For Stated Goals: Good  Regarding [address] [facility] therapy, I certify that the treatment plan above will be carried out by a therapist or under their direction. Thank you for this referral,  Magi Rivas, PT     Referring Physician Signature: Jami Wen, DO _______________________________ Date _____________     PAIN/SUBJECTIVE:   Initial:   8 Post Session:  4/10   HISTORY:   History of Injury/Illness (Reason for Referral):  Ms. Malgorzata Hernadez presents to physical therapy with neck pain starting in May 2020. Pt reports that she had a similar bout of neck pain in 2019 that was treated with PT and mostly resolved. In May of this year she was pulled and fell down, reinjuring her neck. Since this injury she has had constant pain in the back of her neck with pain that will radiate out to the tip of her R shoulder. Pt also reports having pain down her thoracic spine between her shoulder blades, which starts later during the day or with lifting. Pt reports having headaches 1-3x/wk, located on the top of her head with associated numbness/tingling in the back of her head. She states these HAs will also cause some blurry vision and sensitivity to light. They last several hours and go away with sleep. Pt has no specific easing factors other than sleep/rest or not moving her head. Agg factors include: lifting, driving, turning head, reading. Pt does no report any sym's on her L side and that they have always been on the R side of her neck, shoulder and head. . Pt was receiving manipulations from her doctor every 8-10wk which have helped, however due to [place] has no been able to. Past Medical History/Comorbidities:   Ms. Malgorzata Hernadez  has a past medical history of ADHD.   Ms. Malgorzata Hernadez  has a past surgical history that includes hx lap cholecystectomy; hx wisdom teeth extraction; hx  section; and hx other surgical.  Social History/Living Environment:     Lives alone in apartment  Prior Level of Function/Work/Activity:  Works at [employer] Risk Assessment   Risk Factors:       No Risk Factors Identified Ability to Rise from Chair:       (0)  Ability to rise in a single movement   Falls Prevention Plan:       No modifications necessary   Total: (5 or greater = High Risk): 0   ©2010 [facility] of [employer] States Patent #[id number]. Federal Law prohibits the replication, distribution or use without written permission from [facility] of [employer]   Current Medications:       Current Outpatient Medications:     methylphenidate ER 36 mg 24 hr tab, Take 1 tab daily when working, Disp: 30 Tab, Rfl: 0    methylphenidate ER 36 mg 24 hr tab, Take 1 tab daily when working, Disp: 30 Tab, Rfl: 0    methylphenidate ER 36 mg 24 hr tab, Take 1 tab daily when working, Disp: 30 Tab, Rfl: 0    methocarbamol (ROBAXIN) 500 mg tablet, Take 1 Tab by mouth four (4) times daily. , Disp: 90 Tab, Rfl: 1   Date Last Reviewed:  6/23/2020   Number of Personal Factors/Comorbidities that affect the Plan of Care: 1-2: MODERATE COMPLEXITY   EXAMINATION:   Observation  Posture:  Increased thoracic kyphosis, rounded shoulders, forward head      ROM    Right Left   Flexion Moderate limitation     Extension Moderate limitation     Side Bend  Moderate limitation Moderate limitation   Rotation  47deg 41deg       Strength (all MMT scores are graded on a scale of 0-5)   Right Left   Shoulder      Flexion 5 5   Abduction 5 5   IR 5 5   ER 5 5    Strength       Deep Neck Flexor Hold: 1 sec, required tactile cuing and was painful    Neuro Screen  Myotomes Right Left   C4 Normal Normal   C5 Normal Normal   C6 Normal Normal   C7 Normal Normal   C8 Normal Normal   T1 Normal Normal     Dermatomes Right Left   C4 Normal Normal   C5 Normal Normal   C6 Normal Normal   C7 Normal Normal   C8 Normal Normal   T1 Normal Normal     Reflexes Right Left   Biceps Normal Normal   Brachioradialis Normal Normal   Triceps Normal Normal       Joint/Soft Tissue Mobility   Description   Joint Mobility  Cervical: Hypomobile and painful C2-3, normal mobility with no pain C4-5, painful and hypomobile C6-7   Thoracic: Hypomobile and painful T1-8   Soft Tissue Mobility TTP and reproduction of sym's throughout para-cervical muscles  Reproduction of HA at suboccipitals       Special Tests  Cranio-cervical Flexion-Rotation Test: (+) R/L: 70deg/45deg  Spurlings: (-)  Distraction: (-)         Body Structures Involved:  1. Nerves  2. Joints  3. Muscles  4. Ligaments Body Functions Affected:  1. Neuromusculoskeletal  2. Movement Related Activities and Participation Affected:  1. General Tasks and Demands  2. Mobility  3. Self Care  4. Domestic Life  5. Interpersonal Interactions and Relationships  6.  Community, Social and [place]   Number of elements (examined above) that affect the Plan of Care: 4+: HIGH COMPLEXITY   CLINICAL PRESENTATION:   Presentation: Stable and uncomplicated: LOW COMPLEXITY   CLINICAL DECISION MAKING:   Use of outcome tool(s) and clinical judgement create a POC that gives a: Clear prediction of patient's progress: LOW COMPLEXITY   [place] PT, DPT